# Patient Record
Sex: MALE | Employment: OTHER | URBAN - METROPOLITAN AREA
[De-identification: names, ages, dates, MRNs, and addresses within clinical notes are randomized per-mention and may not be internally consistent; named-entity substitution may affect disease eponyms.]

---

## 2017-07-10 ENCOUNTER — RECORDS - HEALTHEAST (OUTPATIENT)
Dept: LAB | Facility: CLINIC | Age: 72
End: 2017-07-10

## 2017-07-10 ENCOUNTER — HOSPITAL ENCOUNTER (OUTPATIENT)
Dept: LAB | Age: 72
Setting detail: SPECIMEN
Discharge: HOME | End: 2017-07-10

## 2017-07-10 LAB
CHOLEST SERPL-MCNC: 125 MG/DL
FASTING STATUS PATIENT QL REPORTED: ABNORMAL
HDLC SERPL-MCNC: 28 MG/DL
LDLC SERPL CALC-MCNC: 42 MG/DL
TRIGL SERPL-MCNC: 277 MG/DL

## 2018-05-22 ENCOUNTER — RECORDS - HEALTHEAST (OUTPATIENT)
Dept: LAB | Facility: CLINIC | Age: 73
End: 2018-05-22

## 2018-05-22 LAB
ALBUMIN SERPL-MCNC: 4.1 G/DL (ref 3.5–5)
ALP SERPL-CCNC: 99 U/L (ref 45–120)
ALT SERPL W P-5'-P-CCNC: 39 U/L (ref 0–45)
ANION GAP SERPL CALCULATED.3IONS-SCNC: 12 MMOL/L (ref 5–18)
AST SERPL W P-5'-P-CCNC: 27 U/L (ref 0–40)
BILIRUB SERPL-MCNC: 0.5 MG/DL (ref 0–1)
BUN SERPL-MCNC: 13 MG/DL (ref 8–28)
CALCIUM SERPL-MCNC: 9.5 MG/DL (ref 8.5–10.5)
CHLORIDE BLD-SCNC: 105 MMOL/L (ref 98–107)
CHOLEST SERPL-MCNC: 148 MG/DL
CO2 SERPL-SCNC: 22 MMOL/L (ref 22–31)
CREAT SERPL-MCNC: 0.97 MG/DL (ref 0.7–1.3)
FASTING STATUS PATIENT QL REPORTED: ABNORMAL
GFR SERPL CREATININE-BSD FRML MDRD: >60 ML/MIN/1.73M2
GLUCOSE BLD-MCNC: 192 MG/DL (ref 70–125)
HDLC SERPL-MCNC: 32 MG/DL
LDLC SERPL CALC-MCNC: 62 MG/DL
POTASSIUM BLD-SCNC: 4.2 MMOL/L (ref 3.5–5)
PROT SERPL-MCNC: 7.1 G/DL (ref 6–8)
SODIUM SERPL-SCNC: 139 MMOL/L (ref 136–145)
TRIGL SERPL-MCNC: 268 MG/DL

## 2020-12-07 ENCOUNTER — VIRTUAL VISIT (OUTPATIENT)
Dept: ENDOCRINOLOGY | Facility: CLINIC | Age: 75
End: 2020-12-07
Payer: COMMERCIAL

## 2020-12-07 DIAGNOSIS — Z53.9 ERRONEOUS ENCOUNTER--DISREGARD: Primary | ICD-10-CM

## 2020-12-07 RX ORDER — LISINOPRIL 10 MG/1
10 TABLET ORAL DAILY
COMMUNITY
End: 2021-02-01

## 2020-12-07 RX ORDER — TAMSULOSIN HYDROCHLORIDE 0.4 MG/1
0.4 CAPSULE ORAL DAILY
COMMUNITY
End: 2020-12-23

## 2020-12-07 RX ORDER — OMEPRAZOLE 40 MG/1
40 CAPSULE, DELAYED RELEASE ORAL DAILY
COMMUNITY

## 2020-12-07 RX ORDER — GLIPIZIDE 10 MG/1
10 TABLET ORAL
COMMUNITY
End: 2020-12-15

## 2020-12-07 NOTE — PROGRESS NOTES
Unable to start the new patient video visit today, despite several attempts with the text message check-in he requested.  The new patient diabetes appointment will need to be rescheduled. Since he lives in [place], he should see Dr. Macario Bacon at the nearby [facility] clinic.  Our MA mentioned this and patient agreed.       I then spoke with a [facility]  and they will contact patient to schedule him with Dr. Bacon ([facility] or [facility] clinic sites) in the next 2 weeks.    KIANNA Peng MD, MS  Endocrinology  [facility]

## 2020-12-07 NOTE — LETTER
12/7/2020         RE: Javy Brown  [address] [address]        Dear Colleague,    Thank you for referring your patient, Javy Brown, to the [facility]. Please see a copy of my visit note below.    Unable to start the new patient video visit today, despite several attempts with the text message check-in he requested.  The new patient diabetes appointment will need to be rescheduled. Since he lives in [place], he should see Dr. Macario Bacon at the nearby [facility].  Our MA mentioned this and patient agreed.       I then spoke with a [facility]  and they will contact patient to schedule him with Dr. Bacon in the next 2 weeks.    KIANNA Peng MD, MS  Endocrinology  [facility]              Again, thank you for allowing me to participate in the care of your patient.        Sincerely,        Lexx Peng MD

## 2020-12-15 ENCOUNTER — VIRTUAL VISIT (OUTPATIENT)
Dept: ENDOCRINOLOGY | Facility: CLINIC | Age: 75
End: 2020-12-15
Payer: COMMERCIAL

## 2020-12-15 DIAGNOSIS — E11.65 TYPE 2 DIABETES MELLITUS WITH HYPERGLYCEMIA, WITHOUT LONG-TERM CURRENT USE OF INSULIN (H): Primary | ICD-10-CM

## 2020-12-15 PROCEDURE — 99202 OFFICE O/P NEW SF 15 MIN: CPT | Mod: TEL | Performed by: INTERNAL MEDICINE

## 2020-12-15 RX ORDER — GLIPIZIDE 10 MG/1
20 TABLET ORAL
Qty: 360 TABLET | Refills: 3 | COMMUNITY
Start: 2020-12-15 | End: 2021-01-19

## 2020-12-15 NOTE — PROGRESS NOTES
"Javy Brown is a 75 year old male who is being evaluated via a billable telephone visit.      The patient has been notified of following:     \"This telephone visit will be conducted via a call between you and your physician/provider. We have found that certain health care needs can be provided without the need for a physical exam.  This service lets us provide the care you need with a short phone conversation.  If a prescription is necessary we can send it directly to your pharmacy.  If lab work is needed we can place an order for that and you can then stop by our lab to have the test done at a later time.    Telephone visits are billed at different rates depending on your insurance coverage. During this emergency period, for some insurers they may be billed the same as an in-person visit.  Please reach out to your insurance provider with any questions.    If during the course of the call the physician/provider feels a telephone visit is not appropriate, you will not be charged for this service.\"    Patient has given verbal consent for Telephone visit?  Yes    What phone number would you like to be contacted at? [phone]    How would you like to obtain your AVS? Bryce    CC: DM    HPI:   Patient presents for management of DM.   Original diagnosis in the 1990's.     Moved from [place] 3 months ago.   Prior to his move, he was living in a 55+ community where he exercised 3/week.  No regular exercise since.     Tries to limit his sweets but notes he has not done well with limiting carbs from bread, potatoes, rice.     Occasional shooting pain in his feet.     He is taking janumet 1 tab BID, glipizide 20 mg BID.  He checks his glucose 2/day.    or higher.   Lunch - 150's.   Dinner - 170's.  HS - 120-250    Occasional evening snack.     No hypoglycemia since he stopped he stopped his exercise program.     Reports HbA1C prior to leaving [place] in the 8% range.     ROS: 10 point ROS neg other than the " symptoms noted above in the HPI.    PMH:   Type 2 DM  Dyslipidemia  Microalbuminuria    Meds:  Current Outpatient Medications   Medication     ATORVASTATIN CALCIUM PO     Cholecalciferol (VITAMIN D3 PO)     GINKGO BILOBA COMPLEX PO     glipiZIDE (GLUCOTROL) 10 MG tablet     lisinopril (ZESTRIL) 10 MG tablet     omeprazole (PRILOSEC) 40 MG DR capsule     sitagliptin-metFORMIN (JANUMET)  MG tablet     tamsulosin (FLOMAX) 0.4 MG capsule     No current facility-administered medications for this visit.       FHX:   Mother had DM.     SHX:  Non-smoker.   Recently move back to [place] from [place].   3-4 beers a week.     Exam:   Gen: In NAD.     A/P:   Type 2 DM - Outside records reviewed. No recent labs to review. Discussed risks/benefits of SGLT-2i's. Prefers to wait before adding medication.   -Schedule labs.   -CDE referral.   -Increase exercise as tolerated.   -If readings not improving at next visit, we will add farxiga.   -ASA reports as taking.   -BP: due to be checked.   -NAFL/GROSSMAN: screening due.   -Lipids: high triglycerides and low HDL in 2018. On atorvastatin.    Repeat due.   -Microalbumin elevated in 2018. On lisinopril.    Repeat due.   -Eyes: due for an exam.   -Smoking: non-smoker.      Due to the COVID 19 pandemic this visit was a telephone/video visit in order to help prevent spread of infection in this high risk patient and the general population. The patient gave verbal consent for the visit today.    Start time 1029  Stop time 1052  Total time 23  This visit would have been billed as 23513 as an E & M code     Macario Bacon MD on 12/15/2020 at 10:52 AM

## 2020-12-15 NOTE — LETTER
"    12/15/2020         RE: Javy Brown  [address]  [place] [address]        Dear Colleague,    Thank you for referring your patient, Javy Brown, to the [facility]. Please see a copy of my visit note below.    Javy Brown is a 75 year old male who is being evaluated via a billable telephone visit.      The patient has been notified of following:     \"This telephone visit will be conducted via a call between you and your physician/provider. We have found that certain health care needs can be provided without the need for a physical exam.  This service lets us provide the care you need with a short phone conversation.  If a prescription is necessary we can send it directly to your pharmacy.  If lab work is needed we can place an order for that and you can then stop by our lab to have the test done at a later time.    Telephone visits are billed at different rates depending on your insurance coverage. During this emergency period, for some insurers they may be billed the same as an in-person visit.  Please reach out to your insurance provider with any questions.    If during the course of the call the physician/provider feels a telephone visit is not appropriate, you will not be charged for this service.\"    Patient has given verbal consent for Telephone visit?  Yes    What phone number would you like to be contacted at? [phone]    How would you like to obtain your AVS? Bryce    CC: DM    HPI:   Patient presents for management of DM.   Original diagnosis in the 1990's.     Moved from [place] 3 months ago.   Prior to his move, he was living in a 55+ community where he exercised 3/week.  No regular exercise since.     Tries to limit his sweets but notes he has not done well with limiting carbs from bread, potatoes, rice.     Occasional shooting pain in his feet.     He is taking janumet 1 tab BID, glipizide 20 mg BID.  He checks his glucose 2/day.    or higher.   Lunch " - 150's.   Dinner - 170's.  HS - 120-250    Occasional evening snack.     No hypoglycemia since he stopped he stopped his exercise program.     Reports HbA1C prior to leaving [place] in the 8% range.     ROS: 10 point ROS neg other than the symptoms noted above in the HPI.    PMH:   Type 2 DM  Dyslipidemia  Microalbuminuria    Meds:  Current Outpatient Medications   Medication     ATORVASTATIN CALCIUM PO     Cholecalciferol (VITAMIN D3 PO)     GINKGO BILOBA COMPLEX PO     glipiZIDE (GLUCOTROL) 10 MG tablet     lisinopril (ZESTRIL) 10 MG tablet     omeprazole (PRILOSEC) 40 MG DR capsule     sitagliptin-metFORMIN (JANUMET)  MG tablet     tamsulosin (FLOMAX) 0.4 MG capsule     No current facility-administered medications for this visit.       FHX:   Mother had DM.     SHX:  Non-smoker.   Recently move back to [place] from [place].   3-4 beers a week.     Exam:   Gen: In NAD.     A/P:   Type 2 DM - Outside records reviewed. No recent labs to review. Discussed risks/benefits of SGLT-2i's. Prefers to wait before adding medication.   -Schedule labs.   -CDE referral.   -Increase exercise as tolerated.   -If readings not improving at next visit, we will add farxiga.   -ASA reports as taking.   -BP: due to be checked.   -NAFL/GROSSMAN: screening due.   -Lipids: high triglycerides and low HDL in 2018. On atorvastatin.    Repeat due.   -Microalbumin elevated in 2018. On lisinopril.    Repeat due.   -Eyes: due for an exam.   -Smoking: non-smoker.      Due to the COVID 19 pandemic this visit was a telephone/video visit in order to help prevent spread of infection in this high risk patient and the general population. The patient gave verbal consent for the visit today.    Start time 1029  Stop time 1052  Total time 23  This visit would have been billed as 42453 as an E & M code     Macaroi Bacon MD on 12/15/2020 at 10:52 AM            Again, thank you for allowing me to participate in the care of your patient.         Sincerely,        Macario Bacon MD

## 2020-12-21 DIAGNOSIS — E11.65 TYPE 2 DIABETES MELLITUS WITH HYPERGLYCEMIA, WITHOUT LONG-TERM CURRENT USE OF INSULIN (H): ICD-10-CM

## 2020-12-21 LAB
ALT SERPL W P-5'-P-CCNC: 28 U/L (ref 0–70)
ANION GAP SERPL CALCULATED.3IONS-SCNC: 7 MMOL/L (ref 3–14)
AST SERPL W P-5'-P-CCNC: 15 U/L (ref 0–45)
BUN SERPL-MCNC: 14 MG/DL (ref 7–30)
CALCIUM SERPL-MCNC: 9 MG/DL (ref 8.5–10.1)
CHLORIDE SERPL-SCNC: 102 MMOL/L (ref 94–109)
CHOLEST SERPL-MCNC: 122 MG/DL
CO2 SERPL-SCNC: 28 MMOL/L (ref 20–32)
CREAT SERPL-MCNC: 1.12 MG/DL (ref 0.66–1.25)
CREAT UR-MCNC: 187 MG/DL
GFR SERPL CREATININE-BSD FRML MDRD: 65 ML/MIN/{1.73_M2}
GLUCOSE SERPL-MCNC: 268 MG/DL (ref 70–99)
HBA1C MFR BLD: 9.3 % (ref 0–5.6)
HDLC SERPL-MCNC: 37 MG/DL
LDLC SERPL CALC-MCNC: 43 MG/DL
MICROALBUMIN UR-MCNC: 57 MG/L
MICROALBUMIN/CREAT UR: 30.48 MG/G CR (ref 0–17)
NONHDLC SERPL-MCNC: 85 MG/DL
POTASSIUM SERPL-SCNC: 4.2 MMOL/L (ref 3.4–5.3)
SODIUM SERPL-SCNC: 137 MMOL/L (ref 133–144)
TRIGL SERPL-MCNC: 210 MG/DL

## 2020-12-21 PROCEDURE — 84460 ALANINE AMINO (ALT) (SGPT): CPT | Performed by: INTERNAL MEDICINE

## 2020-12-21 PROCEDURE — 36415 COLL VENOUS BLD VENIPUNCTURE: CPT | Performed by: INTERNAL MEDICINE

## 2020-12-21 PROCEDURE — 83036 HEMOGLOBIN GLYCOSYLATED A1C: CPT | Performed by: INTERNAL MEDICINE

## 2020-12-21 PROCEDURE — 84450 TRANSFERASE (AST) (SGOT): CPT | Performed by: INTERNAL MEDICINE

## 2020-12-21 PROCEDURE — 80061 LIPID PANEL: CPT | Performed by: INTERNAL MEDICINE

## 2020-12-21 PROCEDURE — 80048 BASIC METABOLIC PNL TOTAL CA: CPT | Performed by: INTERNAL MEDICINE

## 2020-12-21 PROCEDURE — 82043 UR ALBUMIN QUANTITATIVE: CPT | Performed by: INTERNAL MEDICINE

## 2020-12-22 DIAGNOSIS — N40.0 BENIGN PROSTATIC HYPERPLASIA, UNSPECIFIED WHETHER LOWER URINARY TRACT SYMPTOMS PRESENT: ICD-10-CM

## 2020-12-22 DIAGNOSIS — E11.65 TYPE 2 DIABETES MELLITUS WITH HYPERGLYCEMIA, WITHOUT LONG-TERM CURRENT USE OF INSULIN (H): Primary | ICD-10-CM

## 2020-12-22 NOTE — TELEPHONE ENCOUNTER
.Reason for Call:  Medication or medication refill:    Do you use a [facility]?  Name of the pharmacy and phone number for the current request:  [facility] [phone]    Name of the medication requested:     sitagliptin-metFORMIN (JANUMET)  MG tablet 1 tablet, 2 TIMES DAILY WITH MEALS     tamsulosin (FLOMAX) 0.4 MG capsule 0.4 mg, DAILY         Other request: Patient also needing the new medication that was being added to his current medications; Patient states it may have been Jardiance or an equivalent;           Can we leave a detailed message on this number? YES    Phone number patient can be reached at: Home number on file [phone] (home)    Best Time: anytime    Call taken on 12/22/2020 at 11:35 AM by Meena Bhatt

## 2020-12-23 RX ORDER — TAMSULOSIN HYDROCHLORIDE 0.4 MG/1
0.4 CAPSULE ORAL DAILY
Qty: 90 CAPSULE | Refills: 3 | Status: SHIPPED | OUTPATIENT
Start: 2020-12-23 | End: 2021-12-14

## 2021-01-05 ENCOUNTER — TRANSFERRED RECORDS (OUTPATIENT)
Dept: HEALTH INFORMATION MANAGEMENT | Facility: CLINIC | Age: 76
End: 2021-01-05

## 2021-01-05 LAB — RETINOPATHY: NEGATIVE

## 2021-01-07 ENCOUNTER — OFFICE VISIT (OUTPATIENT)
Dept: ENDOCRINOLOGY | Facility: CLINIC | Age: 76
End: 2021-01-07
Payer: COMMERCIAL

## 2021-01-07 ENCOUNTER — PATIENT OUTREACH (OUTPATIENT)
Dept: EDUCATION SERVICES | Facility: CLINIC | Age: 76
End: 2021-01-07
Attending: INTERNAL MEDICINE
Payer: COMMERCIAL

## 2021-01-07 VITALS
SYSTOLIC BLOOD PRESSURE: 133 MMHG | DIASTOLIC BLOOD PRESSURE: 68 MMHG | HEIGHT: 71 IN | HEART RATE: 75 BPM | RESPIRATION RATE: 14 BRPM | BODY MASS INDEX: 30.1 KG/M2 | WEIGHT: 215 LBS

## 2021-01-07 DIAGNOSIS — E78.5 DYSLIPIDEMIA: ICD-10-CM

## 2021-01-07 DIAGNOSIS — I10 ESSENTIAL HYPERTENSION: ICD-10-CM

## 2021-01-07 DIAGNOSIS — R80.9 MICROALBUMINURIA: ICD-10-CM

## 2021-01-07 DIAGNOSIS — E11.65 TYPE 2 DIABETES MELLITUS WITH HYPERGLYCEMIA, WITHOUT LONG-TERM CURRENT USE OF INSULIN (H): Primary | ICD-10-CM

## 2021-01-07 DIAGNOSIS — E11.65 TYPE 2 DIABETES MELLITUS WITH HYPERGLYCEMIA, WITHOUT LONG-TERM CURRENT USE OF INSULIN (H): ICD-10-CM

## 2021-01-07 PROBLEM — E11.9 DIABETES MELLITUS, TYPE 2 (H): Status: ACTIVE | Noted: 2021-01-07

## 2021-01-07 PROCEDURE — 99214 OFFICE O/P EST MOD 30 MIN: CPT | Performed by: INTERNAL MEDICINE

## 2021-01-07 PROCEDURE — G0108 DIAB MANAGE TRN  PER INDIV: HCPCS | Mod: 95

## 2021-01-07 RX ORDER — DAPAGLIFLOZIN 5 MG/1
5 TABLET, FILM COATED ORAL DAILY
Qty: 90 TABLET | Refills: 3 | Status: SHIPPED | OUTPATIENT
Start: 2021-01-07

## 2021-01-07 RX ORDER — FLASH GLUCOSE SENSOR
1 KIT MISCELLANEOUS
Qty: 6 EACH | Refills: 3 | Status: SHIPPED | OUTPATIENT
Start: 2021-01-07 | End: 2021-11-17

## 2021-01-07 ASSESSMENT — MIFFLIN-ST. JEOR: SCORE: 1742.36

## 2021-01-07 NOTE — PATIENT INSTRUCTIONS
-Add farxiga 5 mg every day.   -Diet changed as discussed with Selena earlier today.   -Rx for Meera sensors sent.    -Labs in 3 months and see me after.

## 2021-01-07 NOTE — PROGRESS NOTES
"S:   Patient presents for management of DM.   Original diagnosis in the 1990's.     Moved from [place] 3 months ago (9/2020).  Prior to his move, he was living in a 55+ community where he exercised 3/week.  No regular exercise since.   Tries to limit his sweets but notes he has not done well with limiting carbs from bread, potatoes, rice.     He is taking janumet 1 tab BID, glipizide 20 mg BID.    Logs:  Avg 239, SD 52  Highest readings between 2059-4467.   Checking BID most days.     ROS: 10 point ROS neg other than the symptoms noted above in the HPI.    Exam:   Vital signs:      BP: 133/68 Pulse: 75   Resp: 14       Height: 180.3 cm (5' 11\") Weight: 97.5 kg (215 lb)  Estimated body mass index is 29.99 kg/m  as calculated from the following:    Height as of this encounter: 1.803 m (5' 11\").    Weight as of this encounter: 97.5 kg (215 lb).  Gen: In NAD.   HEENT: no proptosis or lid lag, EOMI, MMM.   Card: S1 S2 RRR no m/r/g.  Pulm: CTA b/l.   Ext: no LE edema.   Neuro: no tremor, +2 DTR's.  Normal monofilament.     A/P:   Type 2 DM - Outside records reviewed. No recent labs to review. Discussed risks/benefits of SGLT-2i's. Prefers to wait before adding medication.   In 1/2021, 9.3%. SGLT-2i's reviewed again. We also discussed GLP-1 RA's.   -Add farxiga 5 mg every day.   -Diet changed as discussed with Selena earlier today.   -Rx for Meera sensors sent.    -ASA reports as taking.   -BP: normal.   -NAFL/GROSSMAN: normal ALT and AST in 12/2020.   -Lipids: high triglycerides and low HDL in 2018. On atorvastatin.    Trg 210, HDL 37, LDL 43 in 12/2020.   -Microalbumin elevated in 2018. On lisinopril.    30.48 in 12/2020.   -Eyes: reports normal exam in 1/2021 at [facility].   -Smoking: non-smoker.      Dyslipidemia - management as above.    Microalbuminuria - management as above.    Macario Bacon MD on 1/7/2021 at 12:39 PM    "

## 2021-01-07 NOTE — NURSING NOTE
"Chief Complaint   Patient presents with     RECHECK     Diabetes       Initial /68 (BP Location: Left arm, Patient Position: Sitting, Cuff Size: Adult Large)   Pulse 75   Resp 14   Ht 1.803 m (5' 11\")   Wt 97.5 kg (215 lb)   BMI 29.99 kg/m   Estimated body mass index is 29.99 kg/m  as calculated from the following:    Height as of this encounter: 1.803 m (5' 11\").    Weight as of this encounter: 97.5 kg (215 lb).  BP completed using cuff size: large  Medications and allergies reviewed.      Kya ONEILL MA    "

## 2021-01-07 NOTE — PROGRESS NOTES
"Diabetes Self-Management Education & Support  Patient verbally consented to the telephone visit service today: yes      Presents for: Individual review    SUBJECTIVE/OBJECTIVE:  Presents for: Individual review  Accompanied by: Self  Diabetes education in the past 24mo: No  Diabetes type: Type 2  Disease course: Worsening  How confident are you filling out medical forms by yourself:: Not Assessed  Diabetes management related comments/concerns: Sometimes I do great, but nutrition is the hardest part for me.  Sometimes I feel deprived which has been exaserbated by the pandemic.  I like activity and I'll get into a routine.  Transportation concerns: No  Difficulty affording diabetes medication?: No  Difficulty affording diabetes testing supplies?: No  Other concerns:: None  Cultural Influences/Ethnic Background:  Mexican    Diabetes Symptoms & Complications:  Patient Problem List and Family Medical History reviewed for relevant medical history, current medical status, and diabetes risk factors.    Feeling a bit depressed since his parents  in their 80s and wrestles with \"well I've made it this far\".   For the effort will there be an appreciable value that will come of the changes.      A1c 9.9 in [place]    In September average increased from 150->250, he had not seen his doctor for about 1 year.  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      Was going to gym 4 days per week when living in [place].  Would sweat for 45-60 minutes at a time.  BG would drop below 100.    Yesterday worked on moving which involved lifting and stairs.  BG before 325 in am to 196 after moving.      Vitals:  There were no vitals taken for this visit.  There is no height or weight on file to calculate BMI.   Last 3 BP:   BP Readings from Last 3 Encounters:   No data found for BP     Patient reports home /80    History   Smoking Status     Never Smoker   Smokeless Tobacco     " Never Used       Labs:  Lab Results   Component Value Date    A1C 9.3 12/21/2020     Lab Results   Component Value Date     12/21/2020     Lab Results   Component Value Date    LDL 43 12/21/2020     HDL Cholesterol   Date Value Ref Range Status   12/21/2020 37 (L) >39 mg/dL Final   ]  GFR Estimate   Date Value Ref Range Status   12/21/2020 65 >60 mL/min/[1.73_m2] Final     Comment:     Non  GFR Calc  Starting 12/18/2018, serum creatinine based estimated GFR (eGFR) will be   calculated using the Chronic Kidney Disease Epidemiology Collaboration   (CKD-EPI) equation.       GFR Estimate If Black   Date Value Ref Range Status   12/21/2020 75 >60 mL/min/[1.73_m2] Final     Comment:      GFR Calc  Starting 12/18/2018, serum creatinine based estimated GFR (eGFR) will be   calculated using the Chronic Kidney Disease Epidemiology Collaboration   (CKD-EPI) equation.       Lab Results   Component Value Date    CR 1.12 12/21/2020     No results found for: MICROALBUMIN    Healthy Eating:  Healthy Eating Assessed Today: Yes  Cultural/Jain diet restrictions?: No  Meals include: Breakfast, Lunch, Dinner, Morning Snack, Afternoon Snack, Evening Snack  Breakfast: 10am-eggs, ham, toast with butter, coffee with creamer-  after 2 hrs  Lunch: 1:39pm- 196 after doing stairs.  soup (creamy chicken wild rice), saltine crackers, water.  Dinner: 7:05pm- hamburger helper (cheezy enchillada), burger, rice, and black beans  Snacks: pie (3 bites), nuts, beer, 9pm-apple  Beverages: Water, Alcohol, Coffee  Has patient met with a dietitian in the past?: Yes    1/5/21:  B-7:45am-220 fasting- kumar, cheddar, egg sandwich, coffee with cream  2:50pm- Taquitos- 6 (50g carbohydrate), refried beans, salasa  Bagel with cream cheese, apricot preserves, coffee  Snack- nuts with chocolate  BG- 5:11pm- 360  Dinner- 6:45pm- frozen meal- coconut currry with brown rice and squash  7:25pm- toast with peanut butter  and jam  9pm- apple, tortilla chips (est 12) and hummus    Vegetables- sometimes has carrots and celery, will sometimes have some in bean soup    Being Active:  Being Active Assessed Today: Yes  Exercise:: Currently not exercising  Barrier to exercise: Time(limited due to moving)    Monitoring:  Monitoring Assessed Today: Yes  Did patient bring glucose meter to appointment? : No  Times checking blood sugar at home (number): 2  Times checking blood sugar at home (per): Day  Blood glucose trend: Fluctuating    Testing twice per day, then will eat worse after testing knowing he doesn't have to test again.2    Taking Medications:  Diabetes Medication(s)     Sulfonylureas       glipiZIDE (GLUCOTROL) 10 MG tablet    Take 2 tablets (20 mg) by mouth 2 times daily (before meals) Taking 2 tabs in the AM and 2 tabs in the PM    Antidiabetic Combinations       sitagliptin-metFORMIN (JANUMET)  MG tablet    Take 1 tablet by mouth 2 times daily (with meals)          Taking Medication Assessed Today: Yes  Current Treatments: Oral Medication (taken by mouth)  Problems taking diabetes medications regularly?: No  Diabetes medication side effects?: No    Problem Solving:  Problem Solving Assessed Today: No    Reducing Risks:  Reducing Risks Assessed Today: Yes  Diabetes Risks: Age over 45 years, Family History  CAD Risks: Male sex, Diabetes Mellitus  Has dilated eye exam at least once a year?: Yes(will need cataract surgery in a few years)    Healthy Coping:  Healthy Coping Assessed Today: Yes  Emotional response to diabetes: Ready to learn  Informal Support system:: Family  Stage of change: ACTION (Actively working towards change)  Support resources: None  Patient Activation Measure Survey Score:  No flowsheet data found.    Diabetes knowledge and skills assessment:   Patient is knowledgeable in diabetes management concepts related to: Healthy Eating, Being Active, Monitoring and Taking Medication    Patient needs further  education on the following diabetes management concepts: Healthy Eating, Being Active, Monitoring and Taking Medication    Based on learning assessment above, most appropriate setting for further diabetes education would be: Individual setting.      INTERVENTIONS:    Education provided today on:  ASHLEYE Self-Care Behaviors:  Healthy Eating: carbohydrate counting, consistency in amount, composition, and timing of food intake, portion control, plate planning method, label reading and decreasing sodium. Recommended looking at labels for portion size, total carbohydrate, and sodium.  Recommended 60g total carbohydrate per meal up to 75g if he becomes highly active.     Being Active: relationship to blood glucose and blood pressure.     Monitoring: Discussed trying the freestyle josefina if covered by insurance OR cash pay as a tool to reinforce and learn about blood sugars.  Patient has an iphone 7 and has been interested in learning about it.     Taking Medication: how suggested next medications would help blood pressure and blood sugars.    Opportunities for ongoing education and support in diabetes-self management were discussed.    Pt verbalized understanding of concepts discussed and recommendations provided today.       Education Materials Provided:  [employer] Healthy Living with Diabetes Book and My Plate Planner      ASSESSMENT:  Total diet is high in carbohydrates and low in vegetables.  Recommend sticking to about 60g carbohydrate, but focus on adding in vegetable to 2 meals a day.  Diet is high in sodium and likely contributing to elevated blood pressure.    Patient is motivated and would likely benefit from home CGM likely as a short term tool to learn about home patterns and lifestyle change benefits.        Patient's most recent   Lab Results   Component Value Date    A1C 9.3 12/21/2020    is not meeting goal of <7.0    PLAN  See Patient Instructions for co-developed, patient-stated behavior change goals.  JASON  printed and provided to patient today. See Follow-Up section for recommended follow-up.    Recommend 60g total carbohydrate per meal.    Read labels for serving size, total carbohydrate, and sodium.    DIscuss ordering freestyle josefina with endo and ordering sensors only if not covered by insurance.     Selena Quan MS, RD, LD, CDE    Time Spent: 61 minutes  Encounter Type: Individual    Any diabetes medication dose changes were made via the CDE Protocol and Collaborative Practice Agreement with the patient's endocrinology provider. A copy of this encounter was shared with the provider.

## 2021-01-07 NOTE — LETTER
"    1/7/2021         RE: Javy Brown  [address]  [place] [address]        Dear Colleague,    Thank you for referring your patient, Javy Brown, to the [facility]. Please see a copy of my visit note below.    S:   Patient presents for management of DM.   Original diagnosis in the 1990's.     Moved from [place] 3 months ago (9/2020).  Prior to his move, he was living in a [place]+ community where he exercised 3/week.  No regular exercise since.   Tries to limit his sweets but notes he has not done well with limiting carbs from bread, potatoes, rice.     He is taking janumet 1 tab BID, glipizide 20 mg BID.    Logs:  Avg 239, SD 52  Highest readings between 2556-3251.   Checking BID most days.     ROS: 10 point ROS neg other than the symptoms noted above in the HPI.    Exam:   Vital signs:      BP: 133/68 Pulse: 75   Resp: 14       Height: 180.3 cm (5' 11\") Weight: 97.5 kg (215 lb)  Estimated body mass index is 29.99 kg/m  as calculated from the following:    Height as of this encounter: 1.803 m (5' 11\").    Weight as of this encounter: 97.5 kg (215 lb).  Gen: In NAD.   HEENT: no proptosis or lid lag, EOMI, MMM.   Card: S1 S2 RRR no m/r/g.  Pulm: CTA b/l.   Ext: no LE edema.   Neuro: no tremor, +2 DTR's.  Normal monofilament.     A/P:   Type 2 DM - Outside records reviewed. No recent labs to review. Discussed risks/benefits of SGLT-2i's. Prefers to wait before adding medication.   In 1/2021, 9.3%. SGLT-2i's reviewed again. We also discussed GLP-1 RA's.   -Add farxiga 5 mg every day.   -Diet changed as discussed with Selena earlier today.   -Rx for Meera sensors sent.    -ASA reports as taking.   -BP: normal.   -NAFL/GROSSMAN: normal ALT and AST in 12/2020.   -Lipids: high triglycerides and low HDL in 2018. On atorvastatin.    Trg 210, HDL 37, LDL 43 in 12/2020.   -Microalbumin elevated in 2018. On lisinopril.    30.48 in 12/2020.   -Eyes: reports normal exam in 1/2021 at [facility]. "   -Smoking: non-smoker.      Dyslipidemia - management as above.    Microalbuminuria - management as above.    Macario Bacon MD on 1/7/2021 at 12:39 PM        Again, thank you for allowing me to participate in the care of your patient.        Sincerely,        Macario Bacon MD

## 2021-01-09 ENCOUNTER — HEALTH MAINTENANCE LETTER (OUTPATIENT)
Age: 76
End: 2021-01-09

## 2021-01-18 DIAGNOSIS — E11.65 TYPE 2 DIABETES MELLITUS WITH HYPERGLYCEMIA, WITHOUT LONG-TERM CURRENT USE OF INSULIN (H): Primary | ICD-10-CM

## 2021-01-19 RX ORDER — GLIPIZIDE 10 MG/1
20 TABLET ORAL
Qty: 360 TABLET | Refills: 0 | Status: SHIPPED | OUTPATIENT
Start: 2021-01-19 | End: 2021-04-20

## 2021-01-19 NOTE — TELEPHONE ENCOUNTER
Patient called to check on status of refill; currently ordered a historical.      Disp Refills Start End OLESYA   glipiZIDE (GLUCOTROL) 10 MG tablet 360 tablet 3 12/15/2020  No   Sig - Route: Take 2 tablets (20 mg) by mouth 2 times daily (before meals) Taking 2 tabs in the AM and 2 tabs in the PM - Oral   Class: Historical   Order: [id number]

## 2021-02-01 DIAGNOSIS — R80.9 MICROALBUMINURIA: Primary | ICD-10-CM

## 2021-02-02 RX ORDER — LISINOPRIL 10 MG/1
10 TABLET ORAL DAILY
Qty: 90 TABLET | Refills: 0 | Status: SHIPPED | OUTPATIENT
Start: 2021-02-02 | End: 2021-06-28

## 2021-02-02 NOTE — TELEPHONE ENCOUNTER
Per LOV: 1/7/2021.   -Microalbumin elevated in 2018. On lisinopril.               30.48 in 12/2020  Microalbuminuria - management as above    Patient to follow-up in 3 months. Refill sent per dictation   Patient has office visit planned for 4/2021 with Dr. Arleen RIZVI. RN   Specialty Clinics

## 2021-02-04 ENCOUNTER — MYC MEDICAL ADVICE (OUTPATIENT)
Dept: ENDOCRINOLOGY | Facility: CLINIC | Age: 76
End: 2021-02-04

## 2021-02-04 NOTE — TELEPHONE ENCOUNTER
Unable to complete task from message, no return information provided.    Flavia CHANG RN Specialty Triage 2/4/2021 2:50 PM

## 2021-02-04 NOTE — TELEPHONE ENCOUNTER
Dr Bacon do you want to titrate up to help with GI upset? If so write order in response and appropriate order for titration and order will be sent in.     Flavia CHANG RN Specialty Triage 2/4/2021 1:50 PM

## 2021-02-12 ENCOUNTER — MYC MEDICAL ADVICE (OUTPATIENT)
Dept: ENDOCRINOLOGY | Facility: CLINIC | Age: 76
End: 2021-02-12

## 2021-02-12 NOTE — TELEPHONE ENCOUNTER
Kathi KOROMA, please assist pt with needs for sharing data.    Flavia CHANG RN Specialty Triage 2/12/2021 3:22 PM

## 2021-03-04 ENCOUNTER — PATIENT OUTREACH (OUTPATIENT)
Dept: EDUCATION SERVICES | Facility: CLINIC | Age: 76
End: 2021-03-04
Payer: COMMERCIAL

## 2021-03-04 DIAGNOSIS — F43.9 STRESS: ICD-10-CM

## 2021-03-04 DIAGNOSIS — E11.65 TYPE 2 DIABETES MELLITUS WITH HYPERGLYCEMIA, WITHOUT LONG-TERM CURRENT USE OF INSULIN (H): Primary | ICD-10-CM

## 2021-03-04 PROCEDURE — G0108 DIAB MANAGE TRN  PER INDIV: HCPCS | Mod: 95

## 2021-03-04 NOTE — PROGRESS NOTES
"Diabetes Self-Management Education & Support  Type of Service: Telephone Visit    How would patient like to obtain AVS? Bryce      Presents for: Individual review    SUBJECTIVE/OBJECTIVE:  Presents for: Individual review  Accompanied by: Self  Diabetes education in the past 24mo: No  Focus of Visit: Monitoring, Taking Medication, Healthy Coping, Assistance w/ making life changes  Diabetes type: Type 2  Disease course: Worsening  How confident are you filling out medical forms by yourself:: Not Assessed  Diabetes management related comments/concerns: I think I need to be more active, but I'm also realizing I'm in a different life stage and I feel I could be depresed but I'm not sure.  Transportation concerns: No  Difficulty affording diabetes medication?: No  Difficulty affording diabetes testing supplies?: No  Other concerns:: None  Cultural Influences/Ethnic Background:  Mexican    Diabetes Symptoms & Complications:  Patient Problem List and Family Medical History reviewed for relevant medical history, current medical status, and diabetes risk factors.    Vitals:  There were no vitals taken for this visit.  Estimated body mass index is 29.99 kg/m  as calculated from the following:    Height as of 1/7/21: 1.803 m (5' 11\").    Weight as of 1/7/21: 97.5 kg (215 lb).   Last 3 BP:   BP Readings from Last 3 Encounters:   01/07/21 133/68       History   Smoking Status     Never Smoker   Smokeless Tobacco     Never Used       Labs:  Lab Results   Component Value Date    A1C 9.3 12/21/2020     Lab Results   Component Value Date     12/21/2020     Lab Results   Component Value Date    LDL 43 12/21/2020     HDL Cholesterol   Date Value Ref Range Status   12/21/2020 37 (L) >39 mg/dL Final   ]  GFR Estimate   Date Value Ref Range Status   12/21/2020 65 >60 mL/min/[1.73_m2] Final     Comment:     Non  GFR Calc  Starting 12/18/2018, serum creatinine based estimated GFR (eGFR) will be   calculated using " the Chronic Kidney Disease Epidemiology Collaboration   (CKD-EPI) equation.       GFR Estimate If Black   Date Value Ref Range Status   12/21/2020 75 >60 mL/min/[1.73_m2] Final     Comment:      GFR Calc  Starting 12/18/2018, serum creatinine based estimated GFR (eGFR) will be   calculated using the Chronic Kidney Disease Epidemiology Collaboration   (CKD-EPI) equation.       Lab Results   Component Value Date    CR 1.12 12/21/2020     No results found for: MICROALBUMIN    Healthy Eating:  Healthy Eating Assessed Today: Yes  Cultural/Roman Catholic diet restrictions?: No  Meal planning/habits: Carb counting  Meals include: Breakfast, Lunch, Dinner, Morning Snack, Afternoon Snack, Evening Snack  Breakfast: 10am-eggs, ham, toast with butter, coffee with creamer-  after 2 hrs  Lunch: 1:39pm- 196 after doing stairs.  soup (creamy chicken wild rice), saltine crackers, water.  Dinner: 7:05pm- hamburger helper (cheezy enchillada), burger, rice, and black beans  Snacks: pie (3 bites), nuts, beer, 9pm-apple  Beverages: Water, Alcohol, Coffee  Has patient met with a dietitian in the past?: Yes    Being Active:  Being Active Assessed Today: Yes  Exercise:: Currently not exercising  Barrier to exercise: Time(limited due to moving)    Reports he was more motivated a few months ago, then interest has dropped off.  Has not committed to exercise he know he can do and has done in the past.  Has relied on home projects as activity.  He likes to take naps.      Has pulled out a board games vs. Watching TV to break up the monotony.     Monitoring:  Monitoring Assessed Today: Yes  Did patient bring glucose meter to appointment? : No  Blood Glucose Meter: CGM  Times checking blood sugar at home (number): 2  Times checking blood sugar at home (per): Day  Blood glucose trend: Fluctuating    Likes getting messages from [employer] about his blood sugars and suggestions.                                Taking Medications:  Diabetes  Medication(s)     Sodium-Glucose Co-Transporter 2 (SGLT2) Inhibitors       dapagliflozin (FARXIGA) 5 MG TABS tablet    Take 1 tablet (5 mg) by mouth daily    Sulfonylureas       glipiZIDE (GLUCOTROL) 10 MG tablet    Take 2 tablets (20 mg) by mouth 2 times daily (before meals) Taking 2 tabs in the AM and 2 tabs in the PM    Antidiabetic Combinations       sitagliptin-metFORMIN (JANUMET)  MG tablet    Take 1 tablet by mouth 2 times daily (with meals)          Taking Medication Assessed Today: Yes  Current Treatments: Oral Medication (taken by mouth)  Problems taking diabetes medications regularly?: No  Diabetes medication side effects?: No    Problem Solving:  Problem Solving Assessed Today: No     Reducing Risks:  Reducing Risks Assessed Today: Yes  Diabetes Risks: Age over 45 years, Family History  CAD Risks: Male sex, Diabetes Mellitus  Has dilated eye exam at least once a year?: Yes(will need cataract surgery in a few years)    Healthy Coping:  Healthy Coping Assessed Today: Yes  Emotional response to diabetes: Ready to learn  Informal Support system:: Family  Stage of change: ACTION (Actively working towards change)  Support resources: None  Patient Activation Measure Survey Score:  No flowsheet data found.    Diabetes knowledge and skills assessment:   Patient is knowledgeable in diabetes management concepts related to: Healthy Eating and Being Active    Patient needs further education on the following diabetes management concepts: Monitoring, Taking Medication and Healthy Coping    Based on learning assessment above, most appropriate setting for further diabetes education would be: Individual setting.      INTERVENTIONS:    Education provided today on:  AADE Self-Care Behaviors:  Monitoring: Reviewed josefina results and discussed improvement from large variability to less variability which still indicates improving blood sugars.     Taking Medication: Discussed that current dose changes are not needed at  this time.     Exercise: discussed how exercise can lower blood sugars.  Provided education on recognizing different mental health benefits of exercise and differences between intense high concentration activities vs. Long and slow thinking exercise. Encouraged him to pursue activities he feels best address his mental and physical health.     Healthy Coping: Discussed diabetes and chronic disease burn out and how it can affect thoughts and choices that impact blood sugar.  Informed patient about benefits of talking with a mental health professional for a short time and also periodically as his situation changes.  Discussed that recent large life change may have played a role in leading to burn out as well.  He is interested in speaking with a therapist and CDE will request a referral.    Opportunities for ongoing education and support in diabetes-self management were discussed.    Pt verbalized understanding of concepts discussed and recommendations provided today.       Education Materials Provided:  No new materials provided today      ASSESSMENT:  Patient is pleased with josefina and is enjoying getting messages about his blood sugars from the tonja.  He is experiencing feelings of diabetes burn out, but was not aware this is a common situation that occurs with chronic disease.  He would benefit from a therapist referral to develop tools to address his feelings.        Patient's most recent   Lab Results   Component Value Date    A1C 9.3 12/21/2020    is not meeting goal of <7.0    PLAN  See Patient Instructions for co-developed, patient-stated behavior change goals.  AVS printed and provided to patient today. See Follow-Up section for recommended follow-up.    CDE to message Endocrinologist about referral to mental health specialist to address diabetes burnout.     Selena Quan MS, RD, LD, CDE  Time Spent: 45 minutes  Encounter Type: Individual    Any diabetes medication dose changes were made via the CDE Protocol  and Collaborative Practice Agreement with the patient's endocrinology provider. A copy of this encounter was shared with the provider.

## 2021-03-05 ENCOUNTER — MYC MEDICAL ADVICE (OUTPATIENT)
Dept: EDUCATION SERVICES | Facility: CLINIC | Age: 76
End: 2021-03-05

## 2021-04-06 DIAGNOSIS — E11.65 TYPE 2 DIABETES MELLITUS WITH HYPERGLYCEMIA, WITHOUT LONG-TERM CURRENT USE OF INSULIN (H): ICD-10-CM

## 2021-04-06 LAB
ANION GAP SERPL CALCULATED.3IONS-SCNC: 6 MMOL/L (ref 3–14)
BUN SERPL-MCNC: 17 MG/DL (ref 7–30)
CALCIUM SERPL-MCNC: 8.8 MG/DL (ref 8.5–10.1)
CHLORIDE SERPL-SCNC: 106 MMOL/L (ref 94–109)
CO2 SERPL-SCNC: 27 MMOL/L (ref 20–32)
CREAT SERPL-MCNC: 1.02 MG/DL (ref 0.66–1.25)
GFR SERPL CREATININE-BSD FRML MDRD: 72 ML/MIN/{1.73_M2}
GLUCOSE SERPL-MCNC: 181 MG/DL (ref 70–99)
HBA1C MFR BLD: 7.8 % (ref 0–5.6)
POTASSIUM SERPL-SCNC: 3.9 MMOL/L (ref 3.4–5.3)
SODIUM SERPL-SCNC: 139 MMOL/L (ref 133–144)

## 2021-04-06 PROCEDURE — 80048 BASIC METABOLIC PNL TOTAL CA: CPT | Performed by: INTERNAL MEDICINE

## 2021-04-06 PROCEDURE — 36415 COLL VENOUS BLD VENIPUNCTURE: CPT | Performed by: INTERNAL MEDICINE

## 2021-04-06 PROCEDURE — 83036 HEMOGLOBIN GLYCOSYLATED A1C: CPT | Performed by: INTERNAL MEDICINE

## 2021-04-15 ENCOUNTER — OFFICE VISIT (OUTPATIENT)
Dept: ENDOCRINOLOGY | Facility: CLINIC | Age: 76
End: 2021-04-15
Payer: COMMERCIAL

## 2021-04-15 VITALS
DIASTOLIC BLOOD PRESSURE: 71 MMHG | SYSTOLIC BLOOD PRESSURE: 127 MMHG | HEIGHT: 71 IN | HEART RATE: 97 BPM | RESPIRATION RATE: 14 BRPM | BODY MASS INDEX: 30.1 KG/M2 | WEIGHT: 215 LBS

## 2021-04-15 DIAGNOSIS — E78.5 DYSLIPIDEMIA: ICD-10-CM

## 2021-04-15 DIAGNOSIS — I10 ESSENTIAL HYPERTENSION: ICD-10-CM

## 2021-04-15 DIAGNOSIS — E11.65 TYPE 2 DIABETES MELLITUS WITH HYPERGLYCEMIA, WITHOUT LONG-TERM CURRENT USE OF INSULIN (H): Primary | ICD-10-CM

## 2021-04-15 PROCEDURE — 99214 OFFICE O/P EST MOD 30 MIN: CPT | Performed by: INTERNAL MEDICINE

## 2021-04-15 ASSESSMENT — MIFFLIN-ST. JEOR: SCORE: 1742.36

## 2021-04-15 NOTE — PATIENT INSTRUCTIONS
-Continue janumet and glipizide.   -No change to farxiga dose.     -Read labels for all your food. If not available, look on line on My Fitness Pal. Look specifically at the carbohydrates. Goal of less than 60 grams with meals and less than 30 grams with snacks.   -Start with walking for 30 minutes a day. Increase pace gradually until you are moving quick enough that it is hard to carry on a conversation.     -Labs in 3 months. If HbA1C still above 7.4%, we will increase farxiga.

## 2021-04-15 NOTE — NURSING NOTE
"Chief Complaint   Patient presents with     Diabetes     Follow Up       Initial /71 (BP Location: Right arm, Patient Position: Sitting, Cuff Size: Adult Large)   Pulse 97   Resp 14   Ht 1.803 m (5' 11\")   Wt 97.5 kg (215 lb)   BMI 29.99 kg/m   Estimated body mass index is 29.99 kg/m  as calculated from the following:    Height as of this encounter: 1.803 m (5' 11\").    Weight as of this encounter: 97.5 kg (215 lb).  BP completed using cuff size: large  Medications and allergies reviewed.      Kya ONEILL MA    "

## 2021-04-15 NOTE — LETTER
"    4/15/2021         RE: Javy Brown  [address]  [place] [address]        Dear Colleague,    Thank you for referring your patient, Javy Brown, to the [facility] ENDOCRINOLOGY. Please see a copy of my visit note below.    S:   Patient presents for management of DM.   Original diagnosis in the 1990's.     Moved from [place] 3 months ago (9/2020).  Prior to his move, he was living in a 55+ community where he exercised 3/week.  No regular exercise since.   Tries to limit his sweets but notes he has not done well with limiting carbs from bread, potatoes, rice.     He is taking janumet 1 tab BID, glipizide 20 mg BID, farxiga 5 mg every day.     CGM:  Avg 181, 22.8% variability  53% of time in target range.   Post meal highs. Most pronounced after breakfast.     He has been vaccinated for COVID since our last visit.     Relates that some nights he wakes up and has trouble with mind racing and runny nose.   He will take an anti-histamine OTC which helps.   Trying different foods in the evening. He has not noted a pattern yet.   No hypoglycemia.     Admits he felt he could be more lax on the lifestyle changes when he saw improved readings with farxiga.   No dedicated exercise program.   Admits to feeling burnt out. He has an appointment with a therapist.     ROS: 10 point ROS neg other than the symptoms noted above in the HPI.    Exam:   Vital signs:      BP: 127/71 Pulse: 97   Resp: 14       Height: 180.3 cm (5' 11\") Weight: 97.5 kg (215 lb)  Estimated body mass index is 29.99 kg/m  as calculated from the following:    Height as of this encounter: 1.803 m (5' 11\").    Weight as of this encounter: 97.5 kg (215 lb).  Gen: In NAD.   HEENT: no proptosis or lid lag, EOMI, MMM.   Card: S1 S2 RRR no m/r/g.  Pulm: CTA b/l.   Ext: no LE edema.   Neuro: no tremor, +2 DTR's.  Normal monofilament.     A/P:   Type 2 DM - Outside records reviewed. No recent labs to review. Discussed risks/benefits of " SGLT-2i's. Prefers to wait before adding medication.   In 1/2021, 9.3%. SGLT-2i's reviewed again. We also discussed GLP-1 RA's.   In 4/2021, improved to 7.8% with addition of farxiga. Discussed lifestyle versus higher dose of farxiga.   -Continue janumet and glipizide.   -No change to farxiga dose.   -Read labels for all your food. If not available, look on line on My Fitness Pal. Look specifically at the carbohydrates. Goal of less than 60 grams with meals and less than 30 grams with snacks.   -Start with walking for 30 minutes a day. Increase pace gradually until you are moving quick enough that it is hard to carry on a conversation.   -Labs in 3 months. If HbA1C still above 7.4%, we will increase farxiga.   -ASA reports as taking.   -BP: normal.   -NAFL/GROSSMAN: normal ALT and AST in 12/2020.   -Lipids: high triglycerides and low HDL in 2018. On atorvastatin.    Trg 210, HDL 37, LDL 43 in 12/2020.   -Microalbumin elevated in 2018. On lisinopril.    30.48 in 12/2020.   -Eyes: reports normal exam in 1/[facility] at [facility].   -Smoking: non-smoker.      Dyslipidemia - management as above.    Microalbuminuria - management as above.    Macario Bacon MD on 4/15/2021 at 12:09 PM          Again, thank you for allowing me to participate in the care of your patient.        Sincerely,        Macario Bacon MD

## 2021-04-15 NOTE — PROGRESS NOTES
"S:   Patient presents for management of DM.   Original diagnosis in the 1990's.     Moved from [place] 3 months ago (9/2020).  Prior to his move, he was living in a 55+ community where he exercised 3/week.  No regular exercise since.   Tries to limit his sweets but notes he has not done well with limiting carbs from bread, potatoes, rice.     He is taking janumet 1 tab BID, glipizide 20 mg BID, farxiga 5 mg every day.     CGM:  Avg 181, 22.8% variability  53% of time in target range.   Post meal highs. Most pronounced after breakfast.     He has been vaccinated for COVID since our last visit.     Relates that some nights he wakes up and has trouble with mind racing and runny nose.   He will take an anti-histamine OTC which helps.   Trying different foods in the evening. He has not noted a pattern yet.   No hypoglycemia.     Admits he felt he could be more lax on the lifestyle changes when he saw improved readings with farxiga.   No dedicated exercise program.   Admits to feeling burnt out. He has an appointment with a therapist.     ROS: 10 point ROS neg other than the symptoms noted above in the HPI.    Exam:   Vital signs:      BP: 127/71 Pulse: 97   Resp: 14       Height: 180.3 cm (5' 11\") Weight: 97.5 kg (215 lb)  Estimated body mass index is 29.99 kg/m  as calculated from the following:    Height as of this encounter: 1.803 m (5' 11\").    Weight as of this encounter: 97.5 kg (215 lb).  Gen: In NAD.   HEENT: no proptosis or lid lag, EOMI, MMM.   Card: S1 S2 RRR no m/r/g.  Pulm: CTA b/l.   Ext: no LE edema.   Neuro: no tremor, +2 DTR's.  Normal monofilament.     A/P:   Type 2 DM - Outside records reviewed. No recent labs to review. Discussed risks/benefits of SGLT-2i's. Prefers to wait before adding medication.   In 1/2021, 9.3%. SGLT-2i's reviewed again. We also discussed GLP-1 RA's.   In 4/2021, improved to 7.8% with addition of farxiga. Discussed lifestyle versus higher dose of farxiga.   -Continue janumet and " glipizide.   -No change to farxiga dose.   -Read labels for all your food. If not available, look on line on My Fitness Pal. Look specifically at the carbohydrates. Goal of less than 60 grams with meals and less than 30 grams with snacks.   -Start with walking for 30 minutes a day. Increase pace gradually until you are moving quick enough that it is hard to carry on a conversation.   -Labs in 3 months. If HbA1C still above 7.4%, we will increase farxiga.   -ASA reports as taking.   -BP: normal.   -NAFL/GROSSMAN: normal ALT and AST in 12/2020.   -Lipids: high triglycerides and low HDL in 2018. On atorvastatin.    Trg 210, HDL 37, LDL 43 in 12/2020.   -Microalbumin elevated in 2018. On lisinopril.    30.48 in 12/2020.   -Eyes: reports normal exam in 1/2021 at [facility].   -Smoking: non-smoker.      Dyslipidemia - management as above.    Microalbuminuria - management as above.    Macario Bacon MD on 4/15/2021 at 12:09 PM

## 2021-04-20 DIAGNOSIS — E11.65 TYPE 2 DIABETES MELLITUS WITH HYPERGLYCEMIA, WITHOUT LONG-TERM CURRENT USE OF INSULIN (H): ICD-10-CM

## 2021-04-20 RX ORDER — GLIPIZIDE 10 MG/1
TABLET ORAL
Qty: 360 TABLET | Refills: 0 | Status: SHIPPED | OUTPATIENT
Start: 2021-04-20 | End: 2021-07-20

## 2021-04-20 NOTE — TELEPHONE ENCOUNTER
Prescription approved per [employer] Refill Protocol.    Bryan GOMEZ RN....4/20/2021 3:12 PM

## 2021-05-27 ENCOUNTER — VIRTUAL VISIT (OUTPATIENT)
Dept: EDUCATION SERVICES | Facility: CLINIC | Age: 76
End: 2021-05-27
Payer: COMMERCIAL

## 2021-05-27 DIAGNOSIS — E11.65 TYPE 2 DIABETES MELLITUS WITH HYPERGLYCEMIA, WITHOUT LONG-TERM CURRENT USE OF INSULIN (H): Primary | ICD-10-CM

## 2021-05-27 PROCEDURE — G0108 DIAB MANAGE TRN  PER INDIV: HCPCS

## 2021-05-27 NOTE — PROGRESS NOTES
"Diabetes Self-Management Education & Support    Presents for: CGM Review    Type of Service: Telephone Visit    Audio only visit done, as patient does not have access to audio-visual technology.    Individual visit provided, given no group classes are available for 2 months.     How would patient like to obtain AVS? Bryce      SUBJECTIVE/OBJECTIVE:  Presents for: CGM Review  Accompanied by: Self  Diabetes education in the past 24mo: Yes  Focus of Visit: Self-care behavioral goal setting, Healthy Coping, Taking Medication  Diabetes type: Type 2  Disease course: Stable  How confident are you filling out medical forms by yourself:: Not Assessed  Diabetes management related comments/concerns: I think things are going well.  Difficulty affording diabetes medication?: Sometimes  Difficulty affording diabetes testing supplies?: No  Other concerns:: None  Cultural Influences/Ethnic Background:  Mexican    Diabetes Symptoms & Complications:     Complications assessed today?: No    Patient Problem List and Family Medical History reviewed for relevant medical history, current medical status, and diabetes risk factors.    Vitals:  There were no vitals taken for this visit.  Estimated body mass index is 29.99 kg/m  as calculated from the following:    Height as of 4/15/21: 1.803 m (5' 11\").    Weight as of 4/15/21: 97.5 kg (215 lb).   Last 3 BP:   BP Readings from Last 3 Encounters:   04/15/21 127/71   01/07/21 133/68       History   Smoking Status     Never Smoker   Smokeless Tobacco     Never Used       Labs:  Lab Results   Component Value Date    A1C 7.8 04/06/2021     Lab Results   Component Value Date     04/06/2021     Lab Results   Component Value Date    LDL 43 12/21/2020     HDL Cholesterol   Date Value Ref Range Status   12/21/2020 37 (L) >39 mg/dL Final   ]  GFR Estimate   Date Value Ref Range Status   04/06/2021 72 >60 mL/min/[1.73_m2] Final     Comment:     Non  GFR Calc  Starting " "12/18/2018, serum creatinine based estimated GFR (eGFR) will be   calculated using the Chronic Kidney Disease Epidemiology Collaboration   (CKD-EPI) equation.       GFR Estimate If Black   Date Value Ref Range Status   04/06/2021 84 >60 mL/min/[1.73_m2] Final     Comment:      GFR Calc  Starting 12/18/2018, serum creatinine based estimated GFR (eGFR) will be   calculated using the Chronic Kidney Disease Epidemiology Collaboration   (CKD-EPI) equation.       Lab Results   Component Value Date    CR 1.02 04/06/2021     No results found for: MICROALBUMIN    Healthy Eating:  Healthy Eating Assessed Today: Yes  Meal planning/habits: Smaller portions  Other: Tries to be \"mindful\" of carbs, doesn't make big adjustments to diet but knows what foods could cause a bg spike  Has patient met with a dietitian in the past?: Yes    Being Active:  Being Active Assessed Today: Yes  Exercise:: Yes  Days per week of moderate to strenuous exercise (like a brisk walk): 7  On average, minutes per day of exercise at this level: 30  How intense was your typical exercise? : Moderate (like brisk walking)(exercise bike + strength execise; yardwork)  Exercise Minutes per Week: 210  Barrier to exercise: Access    Monitoring:  Monitoring Assessed Today: Yes  Blood Glucose Meter: CGM  Times checking blood sugar at home (number): 5+  Times checking blood sugar at home (per): Day  Blood glucose trend: No change    See [employer] report below     Taking Medications:  Diabetes Medication(s)     Sodium-Glucose Co-Transporter 2 (SGLT2) Inhibitors       dapagliflozin (FARXIGA) 5 MG TABS tablet    Take 1 tablet (5 mg) by mouth daily    Sulfonylureas       glipiZIDE (GLUCOTROL) 10 MG tablet    TAKE TWO TABLETS BY MOUTH IN THE MORNING AND TWO TABLETS IN THE EVENING BEFORE MEALS    Antidiabetic Combinations       sitagliptin-metFORMIN (JANUMET)  MG tablet    Take 1 tablet by mouth 2 times daily (with meals)          Taking Medication " Assessed Today: Yes  Current Treatments: Oral Medication (taken by mouth)  Problems taking diabetes medications regularly?: No  Diabetes medication side effects?: No    Problem Solving:  Problem Solving Assessed Today: No  Is the patient at risk for DKA?: No  Does patient have severe weather/disaster plan for diabetes management?: No  Does patient have sick day plan for diabetes management?: No              Reducing Risks:  Reducing Risks Assessed Today: No    Healthy Coping:  Healthy Coping Assessed Today: Yes  Emotional response to diabetes: Ready to learn, Acceptance, Concern for health and well-being  Informal Support system:: Spouse, Other  Stage of change: MAINTENANCE (Working to maintain change, with risk of relapse)  Support resources: None  Patient Activation Measure Survey Score:  No flowsheet data found.    Diabetes knowledge and skills assessment:   Patient is knowledgeable in diabetes management concepts related to: Healthy Eating, Being Active, Monitoring, Taking Medication and Problem Solving    Patient needs further education on the following diabetes management concepts: Taking Medication, Reducing Risks and Healthy Coping    Based on learning assessment above, most appropriate setting for further diabetes education would be: Group class or Individual setting.      INTERVENTIONS:    REPORTS:              Education provided today on:  AADE Self-Care Behaviors:  Healthy Eating: consistency in amount, composition, and timing of food intake  Monitoring: individual blood glucose targets, frequency of monitoring and use of time in target range  Taking Medication: side effects of prescribed medications, when to take medications and discussed options if we wanted to decrease meds - explained that patient should expect they will always need some meds   Reducing Risks: A1C - goals, relating to blood glucose levels, how often to check  Healthy Coping: recognize feelings about diagnosis, benefits of making  appropriate lifestyle changes, utilize support systems, methods for coping with stress and when to seek professional counseling    Pt verbalized understanding of concepts discussed and recommendations provided today.       Education Materials Provided:  No new materials provided today    ASSESSMENT:  Patient is overall happy with improvements in blood sugar control. Discussed importance of using Meera data to help with decision making. Additionally, encouraged him to continue to pay attention to diet & exercise. He is wondering if he could ever get off of meds - discussed that he'll likely always need some meds. Encouraged him to utilize therapist to help gain some tools to overcome burnout, that burnout is very common and ok but it'd be best to avoid burnout causing poor control in blood sugars and worsening A1C. Patient is agreeable.      Glucose Patterns & Trends:  Hyperglycemia, weekend- postmeal and weekday- postmeal      PLAN  Overall good control of blood sugars utilizing current med regimen, lifestyle adjustments & Freestyle Meera. No change in current treatment plan.   Reach out to [facility] via [facility] as needed or for annual diabetes ed review  See Follow-Up section for recommended follow-up.    Jayashree Dye RD, LD, [facility]   Time Spent: 31 minutes  Encounter Type: Individual    Any diabetes medication dose changes were made via the CDE Protocol and Collaborative Practice Agreement with the patient's referring provider. A copy of this encounter was shared with the provider.

## 2021-05-27 NOTE — Clinical Note
Just a FYI that David is doing well! Has a therapy appointment set up for next month and will reach out if/when he needs follow up  Thanks,   Jayashree Dye, RD, LD, [facility]

## 2021-06-25 DIAGNOSIS — R80.9 MICROALBUMINURIA: ICD-10-CM

## 2021-06-28 ENCOUNTER — TRANSFERRED RECORDS (OUTPATIENT)
Dept: HEALTH INFORMATION MANAGEMENT | Facility: CLINIC | Age: 76
End: 2021-06-28

## 2021-06-28 ENCOUNTER — VIRTUAL VISIT (OUTPATIENT)
Dept: PSYCHOLOGY | Facility: CLINIC | Age: 76
End: 2021-06-28
Attending: INTERNAL MEDICINE
Payer: COMMERCIAL

## 2021-06-28 DIAGNOSIS — F33.0 MAJOR DEPRESSIVE DISORDER, RECURRENT EPISODE, MILD (H): Primary | ICD-10-CM

## 2021-06-28 DIAGNOSIS — F41.1 GENERALIZED ANXIETY DISORDER: ICD-10-CM

## 2021-06-28 PROCEDURE — 90832 PSYTX W PT 30 MINUTES: CPT | Mod: 95 | Performed by: SOCIAL WORKER

## 2021-06-28 RX ORDER — LISINOPRIL 10 MG/1
10 TABLET ORAL DAILY
Qty: 90 TABLET | Refills: 0 | Status: SHIPPED | OUTPATIENT
Start: 2021-06-28 | End: 2021-10-11

## 2021-06-28 ASSESSMENT — COLUMBIA-SUICIDE SEVERITY RATING SCALE - C-SSRS
2. HAVE YOU ACTUALLY HAD ANY THOUGHTS OF KILLING YOURSELF?: NO
4. HAVE YOU HAD THESE THOUGHTS AND HAD SOME INTENTION OF ACTING ON THEM?: NO
ATTEMPT LIFETIME: NO
6. HAVE YOU EVER DONE ANYTHING, STARTED TO DO ANYTHING, OR PREPARED TO DO ANYTHING TO END YOUR LIFE?: NO
5. HAVE YOU STARTED TO WORK OUT OR WORKED OUT THE DETAILS OF HOW TO KILL YOURSELF? DO YOU INTEND TO CARRY OUT THIS PLAN?: NO
2. HAVE YOU ACTUALLY HAD ANY THOUGHTS OF KILLING YOURSELF LIFETIME?: NO
TOTAL  NUMBER OF INTERRUPTED ATTEMPTS PAST 3 MONTHS: NO
3. HAVE YOU BEEN THINKING ABOUT HOW YOU MIGHT KILL YOURSELF?: NO
TOTAL  NUMBER OF ABORTED OR SELF INTERRUPTED ATTEMPTS PAST 3 MONTHS: NO
TOTAL  NUMBER OF ABORTED OR SELF INTERRUPTED ATTEMPTS PAST LIFETIME: NO
1. IN THE PAST MONTH, HAVE YOU WISHED YOU WERE DEAD OR WISHED YOU COULD GO TO SLEEP AND NOT WAKE UP?: NO
ATTEMPT PAST THREE MONTHS: NO
6. HAVE YOU EVER DONE ANYTHING, STARTED TO DO ANYTHING, OR PREPARED TO DO ANYTHING TO END YOUR LIFE?: NO
4. HAVE YOU HAD THESE THOUGHTS AND HAD SOME INTENTION OF ACTING ON THEM?: NO
TOTAL  NUMBER OF INTERRUPTED ATTEMPTS LIFETIME: NO
5. HAVE YOU STARTED TO WORK OUT OR WORKED OUT THE DETAILS OF HOW TO KILL YOURSELF? DO YOU INTEND TO CARRY OUT THIS PLAN?: NO
1. IN THE PAST MONTH, HAVE YOU WISHED YOU WERE DEAD OR WISHED YOU COULD GO TO SLEEP AND NOT WAKE UP?: NO

## 2021-06-28 ASSESSMENT — ANXIETY QUESTIONNAIRES
3. WORRYING TOO MUCH ABOUT DIFFERENT THINGS: SEVERAL DAYS
5. BEING SO RESTLESS THAT IT IS HARD TO SIT STILL: NOT AT ALL
GAD7 TOTAL SCORE: 5
6. BECOMING EASILY ANNOYED OR IRRITABLE: MORE THAN HALF THE DAYS
7. FEELING AFRAID AS IF SOMETHING AWFUL MIGHT HAPPEN: SEVERAL DAYS
2. NOT BEING ABLE TO STOP OR CONTROL WORRYING: NOT AT ALL
1. FEELING NERVOUS, ANXIOUS, OR ON EDGE: SEVERAL DAYS
IF YOU CHECKED OFF ANY PROBLEMS ON THIS QUESTIONNAIRE, HOW DIFFICULT HAVE THESE PROBLEMS MADE IT FOR YOU TO DO YOUR WORK, TAKE CARE OF THINGS AT HOME, OR GET ALONG WITH OTHER PEOPLE: SOMEWHAT DIFFICULT

## 2021-06-28 ASSESSMENT — PATIENT HEALTH QUESTIONNAIRE - PHQ9
5. POOR APPETITE OR OVEREATING: NOT AT ALL
SUM OF ALL RESPONSES TO PHQ QUESTIONS 1-9: 7

## 2021-06-28 NOTE — PROGRESS NOTES
"                                           Progress Note    Patient Name: Javy Brown  Date: 21         Service Type: Individual      Session Start Time: 11 am  Session End Time: 11:45 am     Session Length: 45 min    Session #: 1    Attendees: Client attended alone    Service Modality:  Phone Visit:      Provider verified identity through the following two step process.  Patient provided:  Patient  and Patient address    The patient has been notified of the following:      \"We have found that certain health care needs can be provided without the need for a face to face visit.  This service lets us provide the care you need with a phone conversation.       I will have full access to your [facility] medical record during this entire phone call.   I will be taking notes for your medical record.      Since this is like an office visit, we will bill your insurance company for this service.       There are potential benefits and risks of telephone visits (e.g. limits to patient confidentiality) that differ from in-person visits.?Confidentiality still applies for telephone services, and nobody will record the visit.  It is important to be in a quiet, private space that is free of distractions (including cell phone or other devices) during the visit.??      If during the course of the call I believe a telephone visit is not appropriate, you will not be charged for this service\"     Consent has been obtained for this service by care team member: Yes      Treatment Plan Last Reviewed:    PHQ-9 / RAQUEL-7 :     DATA  Interactive Complexity: No  Crisis: No       Progress Since Last Session (Related to Symptoms / Goals / Homework):   Symptoms: first session    Homework: Partially completed-complete intake packet I will mail out.     Episode of Care Goals: Minimal progress - PREPARATION (Decided to change - considering how); Intervened by negotiating a change plan and determining options / strategies for " "behavior change, identifying triggers, exploring social supports, and working towards setting a date to begin behavior change    Current / Ongoing Stressors and Concerns:  Lack of motivation. Retired December 2013. Diabetes type 2. Dietician mentioned \"diabetic depression\". Managing diabetes \"just enough to get by\".  Used to work out consistently for years; this helped him feel better and manage his blood sugars.     Treatment Objective(s) Addressed in This Session:   Depression.     Intervention:  Assessed functioning and for safety. Educated on confidentiality and developing rapport. Completed the phq/galen. Completed the columbia (long version). Explored current stressors and reason for counseling referral. Gave ideas such as muscle relaxation and deep breathing.         ASSESSMENT: Current Emotional / Mental Status (status of significant symptoms):   Risk status (Self / Other harm or suicidal ideation)   Patient denies current fears or concerns for personal safety.   Patient denies current or recent suicidal ideation or behaviors.   Patient denies current or recent homicidal ideation or behaviors.   Patient denies current or recent self injurious behavior or ideation.   Patient denies other safety concerns.   Patient reports there has been no change in risk factors since their last session.     Patient reports there has been no change in protective factors since their last session.     Recommended that patient call 911 or go to the local ED should there be a change in any of these risk factors.     Appearance:   Unable to assess.   Eye Contact:   Unable to assess.   Psychomotor Behavior: Unable to assess.   Attitude:   Cooperative    Orientation:   All   Speech    Rate / Production: Talkative Normal     Volume:  Normal    Mood:    Anxious  Depressed  Normal   Affect:    Appropriate    Thought Content:  Clear    Thought Form:  Coherent  Logical    Insight:    Good      Medication Review:   No current psychiatric " medications prescribed     Medication Compliance:   NA     Changes in Health Issues:   None reported     Chemical Use Review:   Substance Use: Chemical use reviewed, no active concerns identified      Tobacco Use: No current tobacco use.      Diagnosis:  Major depression, recurrent, mild severity.  Generalized anxiety disorder.    Collateral Reports Completed:   Routed note to PCP    PLAN: (Patient Tasks / Therapist Tasks / Other)  He was ok waiting a month until next visit versus being placed on wait list. Practice deep breathing and muscle relaxation.   Homework: complete intake packet and return it.        Valentín Pedersen [facility]                                                         ______________________________________________________________________    Treatment Plan    Patient's Name: Javy Brown  YOB: 1945    Date: 6/28/21    DSM5 Diagnoses: 296.31 (F33.0) Major Depressive Disorder, Recurrent Episode, Mild With anxious distress or 300.02 (F41.1) Generalized Anxiety Disorder  Psychosocial / Contextual Factors: recent move. Health concerns.  WHODAS:     Referral / Collaboration:  Referral to another professional/service is not indicated at this time.    Anticipated number of session or this episode of care: 10      MeasurableTreatment Goal(s) related to diagnosis / functional impairment(s)  Goal 1: Patient will      I will know I've met my goal when  .      Objective #A (Patient Action)    Patient will use a healthy coping ideas as needed 100% of trials for 1 week.  Status: New - Date: 6/28/21     Intervention(s)  Therapist will provide ideas and education.    Objective #B  Patient will .  Status:      Intervention(s)  Therapist will .    Objective #C  Patient will .  Status:      Intervention(s)  Therapist will .           Patient has reviewed and agreed to the above plan.      Valentín Pedersen, [facility]  June 28, 2021

## 2021-06-29 ASSESSMENT — ANXIETY QUESTIONNAIRES: GAD7 TOTAL SCORE: 5

## 2021-07-15 ENCOUNTER — OFFICE VISIT (OUTPATIENT)
Dept: ENDOCRINOLOGY | Facility: CLINIC | Age: 76
End: 2021-07-15
Payer: COMMERCIAL

## 2021-07-15 VITALS
DIASTOLIC BLOOD PRESSURE: 63 MMHG | RESPIRATION RATE: 14 BRPM | SYSTOLIC BLOOD PRESSURE: 110 MMHG | HEIGHT: 71 IN | WEIGHT: 215 LBS | BODY MASS INDEX: 30.1 KG/M2 | HEART RATE: 85 BPM

## 2021-07-15 DIAGNOSIS — E78.5 DYSLIPIDEMIA: ICD-10-CM

## 2021-07-15 DIAGNOSIS — E11.65 TYPE 2 DIABETES MELLITUS WITH HYPERGLYCEMIA, WITHOUT LONG-TERM CURRENT USE OF INSULIN (H): Primary | ICD-10-CM

## 2021-07-15 DIAGNOSIS — R80.9 MICROALBUMINURIA: ICD-10-CM

## 2021-07-15 DIAGNOSIS — I10 ESSENTIAL HYPERTENSION: ICD-10-CM

## 2021-07-15 LAB — HBA1C MFR BLD: 7.7 % (ref 0–5.6)

## 2021-07-15 PROCEDURE — 83036 HEMOGLOBIN GLYCOSYLATED A1C: CPT | Performed by: INTERNAL MEDICINE

## 2021-07-15 PROCEDURE — 95251 CONT GLUC MNTR ANALYSIS I&R: CPT | Performed by: INTERNAL MEDICINE

## 2021-07-15 PROCEDURE — 36415 COLL VENOUS BLD VENIPUNCTURE: CPT | Performed by: INTERNAL MEDICINE

## 2021-07-15 PROCEDURE — 99214 OFFICE O/P EST MOD 30 MIN: CPT | Performed by: INTERNAL MEDICINE

## 2021-07-15 ASSESSMENT — MIFFLIN-ST. JEOR: SCORE: 1742.36

## 2021-07-15 NOTE — LETTER
"    7/15/2021         RE: Javy Brown  [address] [place] [address]        Dear Colleague,    Thank you for referring your patient, Javy Brown, to the [facility]. Please see a copy of my visit note below.    S:   Patient presents for management of DM.   Original diagnosis in the 1990's.     Moved from [place] 3 months ago (9/2020).  Prior to his move, he was living in a 55+ community where he exercised 3/week.  No regular exercise since.   Tries to limit his sweets but notes he has not done well with limiting carbs from bread, potatoes, rice.     He is taking janumet 1 tab BID, glipizide 20 mg BID, farxiga 5 mg every day.     CGM:  Avg 174, variability 20.1%.  60% of time in range.    Pattern of post breakfast highs.   Eats at 1000. Peak at 1200.     He has been tracking his steps.  Getting ~7000 a day. This is up from 3500 a day.   Weight stable.     Admits to struggling with diet.   \"Loves\" ice cream.   Feels hitting carb goals of <60 grams with meals and <30 grams with snacks about 50% of the time.     He has begun to look at food labels.     He has begun to speak with a psychologist for depression and burn out from DM.     ROS: 10 point ROS neg other than the symptoms noted above in the HPI.    Exam:   Vital signs:      BP: 110/63 Pulse: 85   Resp: 14       Height: 180.3 cm (5' 11\") Weight: 97.5 kg (215 lb)  Estimated body mass index is 29.99 kg/m  as calculated from the following:    Height as of this encounter: 1.803 m (5' 11\").    Weight as of this encounter: 97.5 kg (215 lb).  Gen: In NAD.   HEENT: no proptosis or lid lag, EOMI, MMM.       A/P:   Type 2 DM - Outside records reviewed. No recent labs to review. Discussed risks/benefits of SGLT-2i's. Prefers to wait before adding medication.   In 1/2021, 9.3%. SGLT-2i's reviewed again. We also discussed GLP-1 RA's.   In 4/2021, improved to 7.8% with addition of farxiga. Discussed lifestyle versus higher dose of " farxiga.   In 7/2021, weight stable. He has increased his activity. Diet still struggling.   -Lab today. If HbA1C is >7.4%, I would recommend increasing the farxiga dose.   -Goal of less than 60 grams with meals and less than 30 grams with snacks.   -Use the My Fitness pal application to track your food.   -Goal of 10,000-15,000 steps every day.   -ASA reports as taking.   -BP: normal.   -NAFL/GROSSMAN: normal ALT and AST in 12/2020.   -Lipids: high triglycerides and low HDL in 2018. On atorvastatin.    Trg 210, HDL 37, LDL 43 in 12/2020.   -Microalbumin elevated in 2018. On lisinopril.    30.48 in 12/2020.   -Eyes: reports normal exam in 1/20[facility] at [facility].   -Smoking: non-smoker.      Dyslipidemia - management as above.    Microalbuminuria - management as above.    This did not include time for CGM review.     Macario Bacon MD on 7/15/2021 at 1:04 PM            Again, thank you for allowing me to participate in the care of your patient.        Sincerely,        Macario Bacon MD

## 2021-07-15 NOTE — PROGRESS NOTES
"S:   Patient presents for management of DM.   Original diagnosis in the 1990's.     Moved from [place] 3 months ago (9/2020).  Prior to his move, he was living in a 55+ community where he exercised 3/week.  No regular exercise since.   Tries to limit his sweets but notes he has not done well with limiting carbs from bread, potatoes, rice.     He is taking janumet 1 tab BID, glipizide 20 mg BID, farxiga 5 mg every day.     CGM:  Avg 174, variability 20.1%.  60% of time in range.    Pattern of post breakfast highs.   Eats at 1000. Peak at 1200.     He has been tracking his steps.  Getting ~7000 a day. This is up from 3500 a day.   Weight stable.     Admits to struggling with diet.   \"Loves\" ice cream.   Feels hitting carb goals of <60 grams with meals and <30 grams with snacks about 50% of the time.     He has begun to look at food labels.     He has begun to speak with a psychologist for depression and burn out from DM.     ROS: 10 point ROS neg other than the symptoms noted above in the HPI.    Exam:   Vital signs:      BP: 110/63 Pulse: 85   Resp: 14       Height: 180.3 cm (5' 11\") Weight: 97.5 kg (215 lb)  Estimated body mass index is 29.99 kg/m  as calculated from the following:    Height as of this encounter: 1.803 m (5' 11\").    Weight as of this encounter: 97.5 kg (215 lb).  Gen: In NAD.   HEENT: no proptosis or lid lag, EOMI, MMM.       A/P:   Type 2 DM - Outside records reviewed. No recent labs to review. Discussed risks/benefits of SGLT-2i's. Prefers to wait before adding medication.   In 1/2021, 9.3%. SGLT-2i's reviewed again. We also discussed GLP-1 RA's.   In 4/2021, improved to 7.8% with addition of farxiga. Discussed lifestyle versus higher dose of farxiga.   In 7/2021, weight stable. He has increased his activity. Diet still struggling.   -Lab today. If HbA1C is >7.4%, I would recommend increasing the farxiga dose.   -Goal of less than 60 grams with meals and less than 30 grams with snacks.   -Use the My " Fitness pal application to track your food.   -Goal of 10,000-15,000 steps every day.   -ASA reports as taking.   -BP: normal.   -NAFL/GROSSMAN: normal ALT and AST in 12/2020.   -Lipids: high triglycerides and low HDL in 2018. On atorvastatin.    Trg 210, HDL 37, LDL 43 in 12/2020.   -Microalbumin elevated in 2018. On lisinopril.    30.48 in 12/2020.   -Eyes: reports normal exam in 1/2021 at [facility].   -Smoking: non-smoker.      Dyslipidemia - management as above.    Microalbuminuria - management as above.    This did not include time for CGM review.     Macario Bacon MD on 7/15/2021 at 1:04 PM

## 2021-07-15 NOTE — NURSING NOTE
"Chief Complaint   Patient presents with     Diabetes     Follow Up       Initial /63 (BP Location: Left arm, Patient Position: Sitting, Cuff Size: Adult Large)   Pulse 85   Resp 14   Ht 1.803 m (5' 11\")   Wt 97.5 kg (215 lb)   BMI 29.99 kg/m   Estimated body mass index is 29.99 kg/m  as calculated from the following:    Height as of this encounter: 1.803 m (5' 11\").    Weight as of this encounter: 97.5 kg (215 lb).  BP completed using cuff size: large  Medications and allergies reviewed.      Kya ONEILL MA    "

## 2021-07-15 NOTE — PATIENT INSTRUCTIONS
-Lab today. If HbA1C is >7.4%, I would recommend increasing the farxiga dose.   -Goal of less than 60 grams with meals and less than 30 grams with snacks.   -Use the [employer] pal application to track your food.   -Goal of 10,000-15,000 steps every day.

## 2021-07-20 DIAGNOSIS — E11.65 TYPE 2 DIABETES MELLITUS WITH HYPERGLYCEMIA, WITHOUT LONG-TERM CURRENT USE OF INSULIN (H): ICD-10-CM

## 2021-07-20 RX ORDER — GLIPIZIDE 10 MG/1
TABLET ORAL
Qty: 360 TABLET | Refills: 0 | Status: SHIPPED | OUTPATIENT
Start: 2021-07-20 | End: 2021-10-11

## 2021-07-20 NOTE — TELEPHONE ENCOUNTER
Prescription approved per [facility] Refill Protocol.    Bryan GMOEZ RN....7/20/2021 11:36 AM

## 2021-07-26 ENCOUNTER — VIRTUAL VISIT (OUTPATIENT)
Dept: PSYCHOLOGY | Facility: CLINIC | Age: 76
End: 2021-07-26
Attending: INTERNAL MEDICINE
Payer: COMMERCIAL

## 2021-07-26 DIAGNOSIS — F33.0 MAJOR DEPRESSIVE DISORDER, RECURRENT EPISODE, MILD (H): Primary | ICD-10-CM

## 2021-07-26 PROCEDURE — 90834 PSYTX W PT 45 MINUTES: CPT | Mod: 95 | Performed by: SOCIAL WORKER

## 2021-07-26 ASSESSMENT — ANXIETY QUESTIONNAIRES
2. NOT BEING ABLE TO STOP OR CONTROL WORRYING: SEVERAL DAYS
5. BEING SO RESTLESS THAT IT IS HARD TO SIT STILL: NOT AT ALL
3. WORRYING TOO MUCH ABOUT DIFFERENT THINGS: NOT AT ALL
GAD7 TOTAL SCORE: 5
1. FEELING NERVOUS, ANXIOUS, OR ON EDGE: SEVERAL DAYS
6. BECOMING EASILY ANNOYED OR IRRITABLE: MORE THAN HALF THE DAYS
IF YOU CHECKED OFF ANY PROBLEMS ON THIS QUESTIONNAIRE, HOW DIFFICULT HAVE THESE PROBLEMS MADE IT FOR YOU TO DO YOUR WORK, TAKE CARE OF THINGS AT HOME, OR GET ALONG WITH OTHER PEOPLE: SOMEWHAT DIFFICULT
7. FEELING AFRAID AS IF SOMETHING AWFUL MIGHT HAPPEN: NOT AT ALL

## 2021-07-26 ASSESSMENT — PATIENT HEALTH QUESTIONNAIRE - PHQ9
SUM OF ALL RESPONSES TO PHQ QUESTIONS 1-9: 6
5. POOR APPETITE OR OVEREATING: SEVERAL DAYS

## 2021-07-26 NOTE — PROGRESS NOTES
"                                           Progress Note    Patient Name: Javy Brown  Date: 21         Service Type: Individual      Session Start Time: 3 pm  Session End Time: 3:45 pm     Session Length: 45 min    Session #: 2    Attendees: Client attended alone    Service Modality:  Phone Visit: unable to connect by video.      Provider verified identity through the following two step process.  Patient provided:  Patient  and Patient address    The patient has been notified of the following:      \"We have found that certain health care needs can be provided without the need for a face to face visit.  This service lets us provide the care you need with a phone conversation.       I will have full access to your [facility] medical record during this entire phone call.   I will be taking notes for your medical record.      Since this is like an office visit, we will bill your insurance company for this service.       There are potential benefits and risks of telephone visits (e.g. limits to patient confidentiality) that differ from in-person visits.?Confidentiality still applies for telephone services, and nobody will record the visit.  It is important to be in a quiet, private space that is free of distractions (including cell phone or other devices) during the visit.??      If during the course of the call I believe a telephone visit is not appropriate, you will not be charged for this service\"     Consent has been obtained for this service by care team member: Yes      Treatment Plan Last Reviewed:    PHQ-9 / RAQUEL-7 :        DATA  Interactive Complexity: No  Crisis: No       Progress Since Last Session (Related to Symptoms / Goals / Homework):   Symptoms: stable    Homework: Partially completed-complete intake packet I will mail out.     Episode of Care Goals: Minimal progress - PREPARATION (Decided to change - considering how); Intervened by negotiating a change plan and determining " "options / strategies for behavior change, identifying triggers, exploring social supports, and working towards setting a date to begin behavior change    Current / Ongoing Stressors and Concerns:  Lack of motivation. Retired December 2013. Diabetes type 2. Dietician mentioned \"diabetic depression\". Managing diabetes \"just enough to get by\".  Used to work out consistently for years; this helped him feel better and manage his blood sugars.     Treatment Objective(s) Addressed in This Session:   Depression.     Intervention:  Assessed functioning and for safety. Educated on confidentiality and developing rapport. Completed the phq/galen. Completed the columbia (long version). Explored current stressors and reason for counseling referral. Gave ideas such as muscle relaxation and deep breathing.         ASSESSMENT: Current Emotional / Mental Status (status of significant symptoms):   Risk status (Self / Other harm or suicidal ideation)   Patient denies current fears or concerns for personal safety.   Patient denies current or recent suicidal ideation or behaviors.   Patient denies current or recent homicidal ideation or behaviors.   Patient denies current or recent self injurious behavior or ideation.   Patient denies other safety concerns.   Patient reports there has been no change in risk factors since their last session.     Patient reports there has been no change in protective factors since their last session.     Recommended that patient call 911 or go to the local ED should there be a change in any of these risk factors.     Appearance:   Unable to assess.   Eye Contact:   Unable to assess.   Psychomotor Behavior: Unable to assess.   Attitude:   Cooperative    Orientation:   All   Speech    Rate / Production: Talkative Normal     Volume:  Normal    Mood:    Anxious  Depressed  Normal   Affect:    Appropriate    Thought Content:  Clear    Thought Form:  Coherent  Logical    Insight:    Good      Medication " "Review:   No current psychiatric medications prescribed     Medication Compliance:   NA     Changes in Health Issues:   None reported     Chemical Use Review:   Substance Use: Chemical use reviewed, no active concerns identified      Tobacco Use: No current tobacco use.      Diagnosis:  Major depression, recurrent, mild severity.  Generalized anxiety disorder.    Collateral Reports Completed:   Routed note to PCP    PLAN: (Patient Tasks / Therapist Tasks / Other)  He was ok waiting a month until next visit versus being placed on wait list. Practice deep breathing and muscle relaxation.   Homework: complete intake packet and return it -DONE. I will mail out 2 copies of his treatment plan; one to sign and return to me. New: google diabetic burnout, type 2.        Valentín Pedersen, [facility]                                                         ______________________________________________________________________    Treatment Plan    Patient's Name: Javy Brown  YOB: 1945    Date: 6/28/21    DSM5 Diagnoses: 296.31 (F33.0) Major Depressive Disorder, Recurrent Episode, Mild With anxious distress or 300.02 (F41.1) Generalized Anxiety Disorder  Psychosocial / Contextual Factors: recent move. Health concerns.  WHODAS:     Referral / Collaboration:  Referral to another professional/service is not indicated at this time.    Anticipated number of session or this episode of care: 10      MeasurableTreatment Goal(s) related to diagnosis / functional impairment(s)  Goal 1: Patient will report coping better with having diabetes self management.     I will know I've met my goal when \"I can recognize the signs or symptoms of diabetic burnout and can keep my blood sugars acceptable.      Objective #A (Patient Action)    Patient will use a healthy coping ideas as needed 100% of trials for 1 week.  Status: New - Date: 6/28/21   IDEAS: listen to music, fishing, dancing, meaningful/heart to heart conversations, "     Intervention(s)  Therapist will provide ideas and education.    Objective #B  Patient will exercise for 30 minutes at least 5 times per week for 4 consecutive weeks.  Status: New- 7/26/21      Intervention(s)  Therapist will educate and monitor progress.              Patient has reviewed and agreed to the above plan.      Valentín Pedersen, [facility]  June 28, 2021

## 2021-07-27 ASSESSMENT — ANXIETY QUESTIONNAIRES: GAD7 TOTAL SCORE: 5

## 2021-07-30 ENCOUNTER — FCC EXTENDED DOCUMENTATION (OUTPATIENT)
Dept: PSYCHOLOGY | Facility: CLINIC | Age: 76
End: 2021-07-30
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
## 2021-08-09 ENCOUNTER — VIRTUAL VISIT (OUTPATIENT)
Dept: PSYCHOLOGY | Facility: CLINIC | Age: 76
End: 2021-08-09
Payer: COMMERCIAL

## 2021-08-09 DIAGNOSIS — F41.1 GENERALIZED ANXIETY DISORDER: ICD-10-CM

## 2021-08-09 DIAGNOSIS — F33.0 MAJOR DEPRESSIVE DISORDER, RECURRENT EPISODE, MILD (H): Primary | ICD-10-CM

## 2021-08-09 PROCEDURE — 90791 PSYCH DIAGNOSTIC EVALUATION: CPT | Mod: 95 | Performed by: SOCIAL WORKER
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
## 2021-08-19 ENCOUNTER — FCC EXTENDED DOCUMENTATION (OUTPATIENT)
Dept: PSYCHOLOGY | Facility: CLINIC | Age: 76
End: 2021-08-19

## 2021-08-19 ENCOUNTER — MYC MEDICAL ADVICE (OUTPATIENT)
Dept: ENDOCRINOLOGY | Facility: CLINIC | Age: 76
End: 2021-08-19

## 2021-08-19 NOTE — PROGRESS NOTES
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      Reason for Discharge:  client cancelled future appointments       Disposition at Time of Last Encounter:   Comments:         Risk Management:   Client denies a history of suicidal ideation, suicide attempts, self-injurious behavior, homicidal ideation, homicidal behavior and and other safety concerns  Recommended that patient call 911 or go to the local ED should there be a change in any of these risk factors.      Referred To:  Continue with psych med management.        Valentín Pedersen, [facility]   8/19/2021  "

## 2021-08-21 ENCOUNTER — MYC MEDICAL ADVICE (OUTPATIENT)
Dept: ENDOCRINOLOGY | Facility: CLINIC | Age: 76
End: 2021-08-21

## 2021-08-21 DIAGNOSIS — E11.65 TYPE 2 DIABETES MELLITUS WITH HYPERGLYCEMIA, WITHOUT LONG-TERM CURRENT USE OF INSULIN (H): Primary | ICD-10-CM

## 2021-08-23 RX ORDER — METFORMIN HCL 500 MG
1000 TABLET, EXTENDED RELEASE 24 HR ORAL
Qty: 360 TABLET | Refills: 3 | Status: SHIPPED | OUTPATIENT
Start: 2021-08-23 | End: 2021-09-20

## 2021-08-23 NOTE — TELEPHONE ENCOUNTER
Please place order for metformin to replace janumet per patient. Patient will continue other medications at this time.    Hui STOREY MA

## 2021-09-17 ENCOUNTER — TELEPHONE (OUTPATIENT)
Dept: ENDOCRINOLOGY | Facility: CLINIC | Age: 76
End: 2021-09-17

## 2021-09-17 DIAGNOSIS — E78.5 DYSLIPIDEMIA: Primary | ICD-10-CM

## 2021-09-17 RX ORDER — ATORVASTATIN CALCIUM 40 MG/1
40 TABLET, FILM COATED ORAL DAILY
Qty: 90 TABLET | Refills: 3 | Status: SHIPPED | OUTPATIENT
Start: 2021-09-17

## 2021-09-17 NOTE — TELEPHONE ENCOUNTER
Patient's call transferred to author  Patient attempting to reach the [place] Care Team     Patient requesting a refill of his Atorvastatin 40mg   Patient requests refill go to [facility]  Patient states he has been on this medication for years    Unable to refill medication per protocol as medication is listed as historical     Will forward to Dr. Bacon to review    Allan Newman RN

## 2021-09-17 NOTE — TELEPHONE ENCOUNTER
Call placed to patient   No answer; generic voicemail left stating that provider refilled requested medication to preferred pharmacy  Clinic RN call back number [phone] given if patient had questions/concerns    Allan Newman RN

## 2021-09-20 ENCOUNTER — TELEPHONE (OUTPATIENT)
Dept: ENDOCRINOLOGY | Facility: CLINIC | Age: 76
End: 2021-09-20

## 2021-09-20 DIAGNOSIS — E11.65 TYPE 2 DIABETES MELLITUS WITH HYPERGLYCEMIA, WITHOUT LONG-TERM CURRENT USE OF INSULIN (H): ICD-10-CM

## 2021-09-20 RX ORDER — METFORMIN HCL 500 MG
1000 TABLET, EXTENDED RELEASE 24 HR ORAL 2 TIMES DAILY WITH MEALS
Qty: 360 TABLET | Refills: 3
Start: 2021-09-20 | End: 2021-10-11

## 2021-09-20 NOTE — TELEPHONE ENCOUNTER
Called patient and he stated around breakfast his blood sugars seem to be high above 200 after breakfast around noon as he eats around 10 or 11 am. BG before eating have not been consistent based on his eating or activity. He used to take Janumet and was changed to Metformin due to cost. He is now taking Metformin 1000 mg daily. He is wondering if he can take this BID due to high glucose in the mornings. He noticed the change in BG levels after he switched meds. Pt stated readings should be uploaded. Please review and advise.  Rossy BROCK RN BSN PHN  Specialty Clinics

## 2021-09-20 NOTE — TELEPHONE ENCOUNTER
[facility] Call Center    Phone Message    May a detailed message be left on voicemail: yes     Reason for Call: Other: Patient called and spoke with writer, he would like someone from [facility] to call him regarding his medication for sitagliptin-metFORMIN (JANUMET)  MG. Please call patient to discuss further.     Action Taken: Message routed to:  [facility] Endo    Travel Screening: Not Applicable

## 2021-09-20 NOTE — TELEPHONE ENCOUNTER
Called pt and informed him of increase to metformin. Advised to call if he had any questions. Advised pt to monitor his blood glucose levels. Pt verbalized understanding.  Rossy BROCK RN BSN PHN  Specialty Clinics

## 2021-09-23 ENCOUNTER — MYC MEDICAL ADVICE (OUTPATIENT)
Dept: ENDOCRINOLOGY | Facility: CLINIC | Age: 76
End: 2021-09-23

## 2021-09-23 DIAGNOSIS — R14.0 BLOATING: Primary | ICD-10-CM

## 2021-09-28 ENCOUNTER — TELEPHONE (OUTPATIENT)
Dept: ALLERGY | Facility: CLINIC | Age: 76
End: 2021-09-28
Payer: COMMERCIAL

## 2021-09-28 NOTE — TELEPHONE ENCOUNTER
[facility] Call Center    Phone Message    May a detailed message be left on voicemail: yes     Reason for Call: Appointment Intake    Referring Provider Name: Dr Macario Bacon  Diagnosis and/or Symptoms: patient has abdominal bloating and wants food allergy testing, new referral needs review. Thanks!    Action Taken: Message routed to:  [facility] Allergy    Travel Screening: Not Applicable

## 2021-10-11 ENCOUNTER — HEALTH MAINTENANCE LETTER (OUTPATIENT)
Age: 76
End: 2021-10-11

## 2021-10-11 ENCOUNTER — TELEPHONE (OUTPATIENT)
Dept: ENDOCRINOLOGY | Facility: CLINIC | Age: 76
End: 2021-10-11

## 2021-10-11 DIAGNOSIS — E11.65 TYPE 2 DIABETES MELLITUS WITH HYPERGLYCEMIA, WITHOUT LONG-TERM CURRENT USE OF INSULIN (H): ICD-10-CM

## 2021-10-11 DIAGNOSIS — R80.9 MICROALBUMINURIA: ICD-10-CM

## 2021-10-11 RX ORDER — METFORMIN HCL 500 MG
1000 TABLET, EXTENDED RELEASE 24 HR ORAL 2 TIMES DAILY WITH MEALS
Qty: 360 TABLET | Refills: 1 | Status: SHIPPED | OUTPATIENT
Start: 2021-10-11

## 2021-10-11 RX ORDER — LISINOPRIL 10 MG/1
10 TABLET ORAL DAILY
Qty: 90 TABLET | Refills: 0 | Status: SHIPPED | OUTPATIENT
Start: 2021-10-11

## 2021-10-11 RX ORDER — GLIPIZIDE 10 MG/1
TABLET ORAL
Qty: 360 TABLET | Refills: 0 | Status: SHIPPED | OUTPATIENT
Start: 2021-10-11

## 2021-10-11 NOTE — TELEPHONE ENCOUNTER
[facility] Call Center    Phone Message    May a detailed message be left on voicemail: yes     Reason for Call: Medication Refill Request    Has the patient contacted the pharmacy for the refill? Yes     Name of medication being requested:   * metFORMIN (GLUCOPHAGE-XR) 500 MG 24 hr tablet    Provider who prescribed the medication: Arleen    [place] [facility] [place] [address]    Date medication is needed: 10/11/2021         Action Taken: Message routed to:  [facility] Endo    Travel Screening: Not Applicable

## 2021-10-11 NOTE — TELEPHONE ENCOUNTER
Pending Prescriptions:                       Disp   Refills    glipiZIDE (GLUCOTROL) 10 MG tablet [Pharm*360 ta*0            Sig: TAKE TWO TABLETS BY MOUTH IN THE MORNING AND TWO           TABLETS IN THE EVENING BEFORE MEALS    RN refilled medication per [facility] Refill Protocol.       Monique MANRIQUE RN, Specialty Clinic 10/11/21 2:36 PM

## 2021-10-11 NOTE — TELEPHONE ENCOUNTER
Medication refilled per [facility] RN protocol.    Rossy BROCK RN BSN PHN  Specialty Clinics          LDL Cholesterol Calculated   Date Value Ref Range Status   12/21/2020 43 <100 mg/dL Final     Comment:     Desirable:       <100 mg/dl     Lab Results   Component Value Date    A1C 7.7 07/15/2021    A1C 7.8 04/06/2021    A1C 9.3 12/21/2020     BP Readings from Last 3 Encounters:   07/15/21 110/63   04/15/21 127/71   01/07/21 133/68

## 2021-10-15 ENCOUNTER — MYC MEDICAL ADVICE (OUTPATIENT)
Dept: ENDOCRINOLOGY | Facility: CLINIC | Age: 76
End: 2021-10-15

## 2021-10-19 ENCOUNTER — PRE VISIT (OUTPATIENT)
Dept: DERMATOLOGY | Facility: CLINIC | Age: 76
End: 2021-10-19

## 2021-10-19 NOTE — TELEPHONE ENCOUNTER
FUTURE VISIT INFORMATION      FUTURE VISIT INFORMATION:    Date: 10/19/21    Time: 10am    Location: [facility]  REFERRAL INFORMATION:    Referring provider:  Dr Macario Bacon    Referring providers clinic:  Endocrine    Reason for visit/diagnosis  Abdominal bloating and wants food allergy testing     RECORDS REQUESTED FROM:       Clinic name Comments Records Status Imaging Status   [facility] Endocrine 9/23/21 mychart encounter   Internal                                      NO PREVIOUS ALLERGY RECS

## 2021-11-11 ENCOUNTER — OFFICE VISIT (OUTPATIENT)
Dept: ENDOCRINOLOGY | Facility: CLINIC | Age: 76
End: 2021-11-11
Payer: COMMERCIAL

## 2021-11-11 VITALS
OXYGEN SATURATION: 95 % | HEIGHT: 71 IN | RESPIRATION RATE: 16 BRPM | HEART RATE: 84 BPM | SYSTOLIC BLOOD PRESSURE: 146 MMHG | DIASTOLIC BLOOD PRESSURE: 74 MMHG | BODY MASS INDEX: 29.99 KG/M2

## 2021-11-11 DIAGNOSIS — I10 ESSENTIAL HYPERTENSION: ICD-10-CM

## 2021-11-11 DIAGNOSIS — R80.9 MICROALBUMINURIA: Primary | ICD-10-CM

## 2021-11-11 DIAGNOSIS — E78.5 DYSLIPIDEMIA: ICD-10-CM

## 2021-11-11 DIAGNOSIS — E11.65 TYPE 2 DIABETES MELLITUS WITH HYPERGLYCEMIA, WITHOUT LONG-TERM CURRENT USE OF INSULIN (H): ICD-10-CM

## 2021-11-11 DIAGNOSIS — E11.65 TYPE 2 DIABETES MELLITUS WITH HYPERGLYCEMIA, WITHOUT LONG-TERM CURRENT USE OF INSULIN (H): Primary | ICD-10-CM

## 2021-11-11 LAB
ANION GAP SERPL CALCULATED.3IONS-SCNC: 6 MMOL/L (ref 3–14)
BUN SERPL-MCNC: 21 MG/DL (ref 7–30)
CALCIUM SERPL-MCNC: 9.2 MG/DL (ref 8.5–10.1)
CHLORIDE BLD-SCNC: 106 MMOL/L (ref 94–109)
CO2 SERPL-SCNC: 26 MMOL/L (ref 20–32)
CREAT SERPL-MCNC: 1.21 MG/DL (ref 0.66–1.25)
GFR SERPL CREATININE-BSD FRML MDRD: 59 ML/MIN/1.73M2
GLUCOSE BLD-MCNC: 187 MG/DL (ref 70–99)
HBA1C MFR BLD: 7.7 % (ref 0–5.6)
POTASSIUM BLD-SCNC: 4.3 MMOL/L (ref 3.4–5.3)
SODIUM SERPL-SCNC: 138 MMOL/L (ref 133–144)

## 2021-11-11 PROCEDURE — 99214 OFFICE O/P EST MOD 30 MIN: CPT | Performed by: INTERNAL MEDICINE

## 2021-11-11 PROCEDURE — 80048 BASIC METABOLIC PNL TOTAL CA: CPT | Performed by: INTERNAL MEDICINE

## 2021-11-11 PROCEDURE — 36415 COLL VENOUS BLD VENIPUNCTURE: CPT | Performed by: INTERNAL MEDICINE

## 2021-11-11 PROCEDURE — 95251 CONT GLUC MNTR ANALYSIS I&R: CPT | Performed by: INTERNAL MEDICINE

## 2021-11-11 PROCEDURE — 83036 HEMOGLOBIN GLYCOSYLATED A1C: CPT | Performed by: INTERNAL MEDICINE

## 2021-11-11 ASSESSMENT — ENCOUNTER SYMPTOMS
DYSURIA: 0
COUGH DISTURBING SLEEP: 0
LOSS OF CONSCIOUSNESS: 0
SEIZURES: 0
SINUS PAIN: 1
NECK PAIN: 1
NUMBNESS: 1
ARTHRALGIAS: 1
SYNCOPE: 0
SLEEP DISTURBANCES DUE TO BREATHING: 0
RECTAL PAIN: 0
POSTURAL DYSPNEA: 0
BLOATING: 1
ABDOMINAL PAIN: 0
DIZZINESS: 1
HEADACHES: 1
VOMITING: 0
SPEECH CHANGE: 0
DYSPNEA ON EXERTION: 1
HEARTBURN: 1
MYALGIAS: 1
SMELL DISTURBANCE: 0
HEMOPTYSIS: 0
PALPITATIONS: 0
FLANK PAIN: 1
WHEEZING: 0
TINGLING: 0
PANIC: 0
INSOMNIA: 1
SNORES LOUDLY: 1
DIFFICULTY URINATING: 1
DEPRESSION: 0
DIARRHEA: 0
LIGHT-HEADEDNESS: 1
WEAKNESS: 1
MUSCLE CRAMPS: 0
SORE THROAT: 0
NAUSEA: 0
BOWEL INCONTINENCE: 0
CONSTIPATION: 0
COUGH: 1
NECK MASS: 1
SPUTUM PRODUCTION: 0
BACK PAIN: 1
HOARSE VOICE: 0
EXERCISE INTOLERANCE: 0
NERVOUS/ANXIOUS: 0
MUSCLE WEAKNESS: 0
LEG PAIN: 0
BLOOD IN STOOL: 0
MEMORY LOSS: 1
ORTHOPNEA: 0
SHORTNESS OF BREATH: 1
SINUS CONGESTION: 1
TREMORS: 0
TROUBLE SWALLOWING: 0
JAUNDICE: 0
STIFFNESS: 1
PARALYSIS: 0
HYPERTENSION: 0
HYPOTENSION: 0
DECREASED CONCENTRATION: 0
JOINT SWELLING: 0
TASTE DISTURBANCE: 0
DISTURBANCES IN COORDINATION: 1
HEMATURIA: 0

## 2021-11-11 ASSESSMENT — PAIN SCALES - GENERAL: PAINLEVEL: NO PAIN (0)

## 2021-11-11 NOTE — PATIENT INSTRUCTIONS
-Check blood pressure 3/week. If consistently >140 for the top number or >90 for the bottom number, call me.     -Labs in 3 months.     -Continue Janumet, glipizide, and farxiga.     -Goal of less than 60 grams with meals and less than 30 grams with snacks. If glucose consistently over 200 after breakfast after making other lifestyle changes, I would lower carb goal for breakfast to under 45 grams.   -Continue to use the [employer] pal application to track your food.   -Goal of 10,000-15,000 steps every day.

## 2021-11-11 NOTE — PROGRESS NOTES
S:   Patient presents for management of DM.   Original diagnosis in the 1990's.     Moved from [place] 3 months ago (9/2020).  Prior to his move, he was living in a [place]+ community where he exercised 3/week.  No regular exercise since.   Tries to limit his sweets but notes he has not done well with limiting carbs from bread, potatoes, rice.     He is taking janumet 1 tab BID, glipizide 20 mg BID, farxiga 5 mg every day.     CGM:      Pattern of early afternoon highs. 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   Eats breakfast at 8144-6467. Usually eggs, toast X2(sugar free jelly), and meat. 2 cups of coffee.   Lunch 1474-6802.   Dinner 1900.     He has been tracking his steps.  Walking less recently. Maybe 4000 steps a day.   Tracking food on My Fitness pal. Trying to stay under 60 grams carbs per meal and under 30 grams for snacks.     Answers for HPI/ROS submitted by the patient on 11/11/2021  General Symptoms: No  Skin Symptoms: No  HENT Symptoms: Yes  EYE SYMPTOMS: No  HEART SYMPTOMS: Yes  LUNG SYMPTOMS: Yes  INTESTINAL SYMPTOMS: Yes  URINARY SYMPTOMS: Yes  REPRODUCTIVE SYMPTOMS: Yes  SKELETAL SYMPTOMS: Yes  BLOOD SYMPTOMS: No  NERVOUS SYSTEM SYMPTOMS: Yes  MENTAL HEALTH SYMPTOMS: Yes  Tinnitus: Yes  Nosebleeds: No  Congestion: Yes  Sinus pain: Yes  Trouble swallowing: No   Voice hoarseness: No  Mouth sores: No  Sore throat: No  Tooth pain: No  Gum tenderness: No  Bleeding gums: No  Change in taste: No  Change in sense of smell: No  Dry mouth: Yes  Hearing aid used: No  Neck lump: Yes  Chest pain or pressure: No  Fast or irregular heartbeat: No  Pain in legs with walking: No  Trouble breathing while lying down: No  Fingers or toes appear blue: No  High blood pressure: No  Low blood pressure: No  Fainting: No  Murmurs: No  Pacemaker: No  Varicose veins: No  Edema or swelling: No  Wake up at night with shortness of breath: No  Light-headedness: Yes  Exercise intolerance: No  Cough: Yes  Sputum or phlegm: No  Coughing up  "blood: No  Difficulty breating or shortness of breath: Yes  Snoring: Yes  Wheezing: No  Difficulty breathing on exertion: Yes  Nighttime Cough: No  Difficulty breathing when lying flat: No  Heart burn or indigestion: Yes  Nausea: No  Vomiting: No  Abdominal pain: No  Bloating: Yes  Constipation: No  Diarrhea: No  Blood in stool: No  Black stools: No  Rectal or Anal pain: No  Fecal incontinence: No  Yellowing of skin or eyes: No  Vomit with blood: No  Change in stools: No  Trouble holding urine or incontinence: Yes  Pain or burning: No  Trouble starting or stopping: No  Increased frequency of urination: No  Blood in urine: No  Decreased frequency of urination: No  Frequent nighttime urination: Yes  Flank pain: Yes  Difficulty emptying bladder: Yes  Scrotal pain or swelling: No  Erectile dysfunction: Yes  Penile discharge: No  Genital ulcers: No  Reduced libido: Yes  Back pain: Yes  Muscle aches: Yes  Neck pain: Yes  Swollen joints: No  Joint pain: Yes  Bone pain: No  Muscle cramps: No  Muscle weakness: No  Joint stiffness: Yes  Bone fracture: No  Trouble with coordination: Yes  Dizziness or trouble with balance: Yes  Fainting or black-out spells: No  Memory loss: Yes  Headache: Yes  Seizures: No  Speech problems: No  Tingling: No  Tremor: No  Weakness: Yes  Difficulty walking: No  Paralysis: No  Numbness: Yes  Nervous or Anxious: No  Depression: No  Trouble sleeping: Yes  Trouble thinking or concentrating: No  Mood changes: Yes  Panic attacks: No      ROS: 10 point ROS neg other than the symptoms noted above in the HPI.    Exam:   Vital signs:      BP: (!) 146/74 Pulse: 84   Resp: 16 SpO2: 95 %     Height: 180.3 cm (5' 11\")    Estimated body mass index is 29.99 kg/m  as calculated from the following:    Height as of this encounter: 1.803 m (5' 11\").    Weight as of 7/15/21: 97.5 kg (215 lb).  Gen: In NAD.   HEENT: no proptosis or lid lag, EOMI, MMM.       A/P:   Type 2 DM - Outside records reviewed. No recent labs to " review. Discussed risks/benefits of SGLT-2i's. Prefers to wait before adding medication.   In 1/2021, 9.3%. SGLT-2i's reviewed again. We also discussed GLP-1 RA's.   In 4/2021, improved to 7.8% with addition of farxiga. Discussed lifestyle versus higher dose of farxiga.   In 7/2021, weight stable. He has increased his activity. Diet still struggling.   In 11/2021, due for labs. Estimate will be the same or higher from prior. Pattern of post breakfast highs. 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 Discussed GLP-1 RA's, prandin, basal insulin, and intensive lifestyle change.   -Continue Janumet, glipizide, and farxiga.   -Goal of less than 60 grams with meals and less than 30 grams with snacks. If glucose consistently over 200 after breakfast after making other lifestyle changes, I would lower carb goal for breakfast to under 45 grams.   -Continue to use the My Fitness pal application to track your food.   -Goal of 10,000-15,000 steps every day.   -Labs in 3 months.   -ASA reports as taking.   -BP:  Mild elevation. Discussed home monitoring.   -NAFL/GROSSMAN: normal ALT and AST in 12/2020.   -Lipids: high triglycerides and low HDL in 2018. On atorvastatin.    Trg 210, HDL 37, LDL 43 in 12/2020.   -Microalbumin elevated in 2018. On lisinopril.    30.48 in 12/2020.   -Eyes: reports normal exam in 1/2021 at [facility].   -Smoking: non-smoker.      Dyslipidemia - management as above.    Microalbuminuria - management as above.    This did not include time for CGM review.     Macario Bacon MD on 11/11/2021 at 1:03 PM

## 2021-11-11 NOTE — LETTER
11/11/2021         RE: Javy Brown  [address] [place] [place] [address]        Dear Colleague,    Thank you for referring your patient, Javy Brown, to the [facility] ENDOCRINOLOGY. Please see a copy of my visit note below.    S:   Patient presents for management of DM.   Original diagnosis in the 1990's.     Moved from [place] 3 months ago (9/2020).  Prior to his move, he was living in a [place]+ community where he exercised 3/week.  No regular exercise since.   Tries to limit his sweets but notes he has not done well with limiting carbs from bread, potatoes, rice.     He is taking janumet 1 tab BID, glipizide 20 mg BID, farxiga 5 mg every day.     CGM:      Pattern of early afternoon highs. 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   Eats breakfast at 6641-9574. Usually eggs, toast X2(sugar free jelly), and meat. 2 cups of coffee.   Lunch 2394-4115.   Dinner 1900.     He has been tracking his steps.  Walking less recently. Maybe 4000 steps a day.   Tracking food on My Fitness pal. Trying to stay under 60 grams carbs per meal and under 30 grams for snacks.     Answers for HPI/ROS submitted by the patient on 11/11/2021  General Symptoms: No  Skin Symptoms: No  HENT Symptoms: Yes  EYE SYMPTOMS: No  HEART SYMPTOMS: Yes  LUNG SYMPTOMS: Yes  INTESTINAL SYMPTOMS: Yes  URINARY SYMPTOMS: Yes  REPRODUCTIVE SYMPTOMS: Yes  SKELETAL SYMPTOMS: Yes  BLOOD SYMPTOMS: No  NERVOUS SYSTEM SYMPTOMS: Yes  MENTAL HEALTH SYMPTOMS: Yes  Tinnitus: Yes  Nosebleeds: No  Congestion: Yes  Sinus pain: Yes  Trouble swallowing: No   Voice hoarseness: No  Mouth sores: No  Sore throat: No  Tooth pain: No  Gum tenderness: No  Bleeding gums: No  Change in taste: No  Change in sense of smell: No  Dry mouth: Yes  Hearing aid used: No  Neck lump: Yes  Chest pain or pressure: No  Fast or irregular heartbeat: No  Pain in legs with walking: No  Trouble breathing while lying down: No  Fingers or toes appear blue: No  High blood pressure:  "No  Low blood pressure: No  Fainting: No  Murmurs: No  Pacemaker: No  Varicose veins: No  Edema or swelling: No  Wake up at night with shortness of breath: No  Light-headedness: Yes  Exercise intolerance: No  Cough: Yes  Sputum or phlegm: No  Coughing up blood: No  Difficulty breating or shortness of breath: Yes  Snoring: Yes  Wheezing: No  Difficulty breathing on exertion: Yes  Nighttime Cough: No  Difficulty breathing when lying flat: No  Heart burn or indigestion: Yes  Nausea: No  Vomiting: No  Abdominal pain: No  Bloating: Yes  Constipation: No  Diarrhea: No  Blood in stool: No  Black stools: No  Rectal or Anal pain: No  Fecal incontinence: No  Yellowing of skin or eyes: No  Vomit with blood: No  Change in stools: No  Trouble holding urine or incontinence: Yes  Pain or burning: No  Trouble starting or stopping: No  Increased frequency of urination: No  Blood in urine: No  Decreased frequency of urination: No  Frequent nighttime urination: Yes  Flank pain: Yes  Difficulty emptying bladder: Yes  Scrotal pain or swelling: No  Erectile dysfunction: Yes  Penile discharge: No  Genital ulcers: No  Reduced libido: Yes  Back pain: Yes  Muscle aches: Yes  Neck pain: Yes  Swollen joints: No  Joint pain: Yes  Bone pain: No  Muscle cramps: No  Muscle weakness: No  Joint stiffness: Yes  Bone fracture: No  Trouble with coordination: Yes  Dizziness or trouble with balance: Yes  Fainting or black-out spells: No  Memory loss: Yes  Headache: Yes  Seizures: No  Speech problems: No  Tingling: No  Tremor: No  Weakness: Yes  Difficulty walking: No  Paralysis: No  Numbness: Yes  Nervous or Anxious: No  Depression: No  Trouble sleeping: Yes  Trouble thinking or concentrating: No  Mood changes: Yes  Panic attacks: No      ROS: 10 point ROS neg other than the symptoms noted above in the HPI.    Exam:   Vital signs:      BP: (!) 146/74 Pulse: 84   Resp: 16 SpO2: 95 %     Height: 180.3 cm (5' 11\")    Estimated body mass index is 29.99 kg/m  " "as calculated from the following:    Height as of this encounter: 1.803 m (5' 11\").    Weight as of 7/15/21: 97.5 kg (215 lb).  Gen: In NAD.   HEENT: no proptosis or lid lag, EOMI, MMM.       A/P:   Type 2 DM - Outside records reviewed. No recent labs to review. Discussed risks/benefits of SGLT-2i's. Prefers to wait before adding medication.   In 1/2021, 9.3%. SGLT-2i's reviewed again. We also discussed GLP-1 RA's.   In 4/2021, improved to 7.8% with addition of farxiga. Discussed lifestyle versus higher dose of farxiga.   In 7/2021, weight stable. He has increased his activity. Diet still struggling.   In 11/2021, due for labs. Estimate will be the same or higher from prior. Pattern of post breakfast highs. 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 Discussed GLP-1 RA's, prandin, basal insulin, and intensive lifestyle change.   -Continue Janumet, glipizide, and farxiga.   -Goal of less than 60 grams with meals and less than 30 grams with snacks. If glucose consistently over 200 after breakfast after making other lifestyle changes, I would lower carb goal for breakfast to under 45 grams.   -Continue to use the My Fitness pal application to track your food.   -Goal of 10,000-15,000 steps every day.   -Labs in 3 months.   -ASA reports as taking.   -BP:  Mild elevation. Discussed home monitoring.   -NAFL/GROSSMAN: normal ALT and AST in 12/2020.   -Lipids: high triglycerides and low HDL in 2018. On atorvastatin.    Trg 210, HDL 37, LDL 43 in 12/2020.   -Microalbumin elevated in 2018. On lisinopril.    30.48 in 12/2020.   -Eyes: reports normal exam in 1/2021 at [facility].   -Smoking: non-smoker.      Dyslipidemia - management as above.    Microalbuminuria - management as above.    This did not include time for CGM review.     Macario Bacon MD on 11/11/2021 at 1:03 PM              Again, thank you for allowing me to participate in the care of your patient.        Sincerely,        Macario Bacon MD    "

## 2021-11-15 ENCOUNTER — TELEPHONE (OUTPATIENT)
Dept: ENDOCRINOLOGY | Facility: CLINIC | Age: 76
End: 2021-11-15
Payer: COMMERCIAL

## 2021-11-15 DIAGNOSIS — E11.65 TYPE 2 DIABETES MELLITUS WITH HYPERGLYCEMIA, WITHOUT LONG-TERM CURRENT USE OF INSULIN (H): Primary | ICD-10-CM

## 2021-11-15 RX ORDER — BLOOD-GLUCOSE METER
KIT MISCELLANEOUS
Qty: 400 STRIP | Refills: 3 | Status: SHIPPED | OUTPATIENT
Start: 2021-11-15

## 2021-11-15 NOTE — TELEPHONE ENCOUNTER
Please route call to Endocrinology pool, not OB GYN.    Renetta Ocasio RN on 11/15/2021 at 3:42 PM

## 2021-11-17 ENCOUNTER — IMMUNIZATION (OUTPATIENT)
Dept: FAMILY MEDICINE | Facility: CLINIC | Age: 76
End: 2021-11-17
Payer: COMMERCIAL

## 2021-11-17 DIAGNOSIS — E11.65 TYPE 2 DIABETES MELLITUS WITH HYPERGLYCEMIA, WITHOUT LONG-TERM CURRENT USE OF INSULIN (H): ICD-10-CM

## 2021-11-17 DIAGNOSIS — Z23 NEED FOR PROPHYLACTIC VACCINATION AND INOCULATION AGAINST INFLUENZA: Primary | ICD-10-CM

## 2021-11-17 PROCEDURE — 90662 IIV NO PRSV INCREASED AG IM: CPT

## 2021-11-17 PROCEDURE — G0008 ADMIN INFLUENZA VIRUS VAC: HCPCS

## 2021-11-17 PROCEDURE — 99207 PR NO CHARGE NURSE ONLY: CPT

## 2021-11-17 RX ORDER — FLASH GLUCOSE SENSOR
KIT MISCELLANEOUS
Qty: 2 EACH | Refills: 3 | Status: SHIPPED | OUTPATIENT
Start: 2021-11-17

## 2021-11-17 NOTE — TELEPHONE ENCOUNTER
Prescription approved per [facility] Refill Protocol.    Bryan GOMEZ RN....11/17/2021 2:08 PM

## 2021-12-14 DIAGNOSIS — N40.0 BENIGN PROSTATIC HYPERPLASIA, UNSPECIFIED WHETHER LOWER URINARY TRACT SYMPTOMS PRESENT: ICD-10-CM

## 2021-12-14 RX ORDER — TAMSULOSIN HYDROCHLORIDE 0.4 MG/1
0.4 CAPSULE ORAL DAILY
Qty: 90 CAPSULE | Refills: 3 | Status: SHIPPED | OUTPATIENT
Start: 2021-12-14

## 2021-12-14 NOTE — TELEPHONE ENCOUNTER
Requested Prescriptions   Pending Prescriptions Disp Refills     tamsulosin (FLOMAX) 0.4 MG capsule 90 capsule 3     Sig: Take 1 capsule (0.4 mg) by mouth daily       There is no refill protocol information for this order        Last office visit: 11/11/2021 with prescribing provider:  Dr. Bacon    Future Office Visit:          [facility]  Special[facility] Clinic CSS

## 2021-12-14 NOTE — TELEPHONE ENCOUNTER
Refilled 12/22/2020  LOV 11/2021- are you managing this medicaiton or should he be following with urology or PCP for refills?     Thank you,   Graciela WAITE RN   Specialty Clinics

## 2022-01-30 ENCOUNTER — HEALTH MAINTENANCE LETTER (OUTPATIENT)
Age: 77
End: 2022-01-30

## 2022-03-27 ENCOUNTER — HEALTH MAINTENANCE LETTER (OUTPATIENT)
Age: 77
End: 2022-03-27

## 2022-07-17 ENCOUNTER — HEALTH MAINTENANCE LETTER (OUTPATIENT)
Age: 77
End: 2022-07-17

## 2022-09-24 ENCOUNTER — HEALTH MAINTENANCE LETTER (OUTPATIENT)
Age: 77
End: 2022-09-24

## 2023-01-29 ENCOUNTER — HEALTH MAINTENANCE LETTER (OUTPATIENT)
Age: 78
End: 2023-01-29

## 2023-05-08 ENCOUNTER — HEALTH MAINTENANCE LETTER (OUTPATIENT)
Age: 78
End: 2023-05-08

## 2023-10-14 ENCOUNTER — HEALTH MAINTENANCE LETTER (OUTPATIENT)
Age: 78
End: 2023-10-14

## 2024-02-28 NOTE — TELEPHONE ENCOUNTER
Macario Bacon MD  You 17 minutes ago (3:54 PM)     MT    Agree to increase metformin to 1000 mg BID.   Macario Bacon MD on 9/20/2021 at 3:54 PM    Message text         Communicable/Infectious

## 2024-03-02 ENCOUNTER — HEALTH MAINTENANCE LETTER (OUTPATIENT)
Age: 79
End: 2024-03-02

## 2024-07-14 ENCOUNTER — HEALTH MAINTENANCE LETTER (OUTPATIENT)
Age: 79
End: 2024-07-14

## 2024-12-01 ENCOUNTER — HEALTH MAINTENANCE LETTER (OUTPATIENT)
Age: 79
End: 2024-12-01